# Patient Record
Sex: MALE | Race: WHITE | NOT HISPANIC OR LATINO | Employment: OTHER | URBAN - METROPOLITAN AREA
[De-identification: names, ages, dates, MRNs, and addresses within clinical notes are randomized per-mention and may not be internally consistent; named-entity substitution may affect disease eponyms.]

---

## 2017-10-05 ENCOUNTER — OFFICE VISIT - RIVER FALLS (OUTPATIENT)
Dept: FAMILY MEDICINE | Facility: CLINIC | Age: 57
End: 2017-10-05

## 2017-10-05 ASSESSMENT — MIFFLIN-ST. JEOR: SCORE: 1664.94

## 2018-01-04 ENCOUNTER — OFFICE VISIT - RIVER FALLS (OUTPATIENT)
Dept: FAMILY MEDICINE | Facility: CLINIC | Age: 58
End: 2018-01-04

## 2018-01-04 ASSESSMENT — MIFFLIN-ST. JEOR: SCORE: 1696.7

## 2018-01-05 LAB
CHOLEST SERPL-MCNC: 143 MG/DL
CHOLEST/HDLC SERPL: 5.7 {RATIO}
CREAT SERPL-MCNC: 1.21 MG/DL (ref 0.7–1.33)
GLUCOSE BLD-MCNC: 99 MG/DL (ref 65–99)
HDLC SERPL-MCNC: 25 MG/DL
LDLC SERPL CALC-MCNC: 85 MG/DL
NONHDLC SERPL-MCNC: 118 MG/DL
PSA SERPL-MCNC: 0.6 NG/ML
TRIGL SERPL-MCNC: 251 MG/DL

## 2018-01-15 ENCOUNTER — AMBULATORY - RIVER FALLS (OUTPATIENT)
Dept: FAMILY MEDICINE | Facility: CLINIC | Age: 58
End: 2018-01-15

## 2018-01-16 ENCOUNTER — AMBULATORY - RIVER FALLS (OUTPATIENT)
Dept: FAMILY MEDICINE | Facility: CLINIC | Age: 58
End: 2018-01-16

## 2018-02-05 ENCOUNTER — OFFICE VISIT - RIVER FALLS (OUTPATIENT)
Dept: FAMILY MEDICINE | Facility: CLINIC | Age: 58
End: 2018-02-05

## 2018-02-16 ENCOUNTER — OFFICE VISIT - RIVER FALLS (OUTPATIENT)
Dept: FAMILY MEDICINE | Facility: CLINIC | Age: 58
End: 2018-02-16

## 2018-02-16 ASSESSMENT — MIFFLIN-ST. JEOR: SCORE: 1696.7

## 2018-08-28 ENCOUNTER — OFFICE VISIT - RIVER FALLS (OUTPATIENT)
Dept: FAMILY MEDICINE | Facility: CLINIC | Age: 58
End: 2018-08-28

## 2018-11-01 ENCOUNTER — OFFICE VISIT - RIVER FALLS (OUTPATIENT)
Dept: FAMILY MEDICINE | Facility: CLINIC | Age: 58
End: 2018-11-01

## 2018-11-01 ASSESSMENT — MIFFLIN-ST. JEOR: SCORE: 1710.3

## 2018-11-29 ENCOUNTER — AMBULATORY - RIVER FALLS (OUTPATIENT)
Dept: FAMILY MEDICINE | Facility: CLINIC | Age: 58
End: 2018-11-29

## 2018-11-30 LAB
CHOLEST SERPL-MCNC: 221 MG/DL
CHOLEST/HDLC SERPL: 8.5 {RATIO}
CREAT SERPL-MCNC: 1.21 MG/DL (ref 0.7–1.33)
GLUCOSE BLD-MCNC: 100 MG/DL (ref 65–99)
HDLC SERPL-MCNC: 26 MG/DL
LDLC SERPL CALC-MCNC: 139 MG/DL
NONHDLC SERPL-MCNC: 195 MG/DL
PSA SERPL-MCNC: 0.8 NG/ML
TRIGL SERPL-MCNC: 366 MG/DL

## 2019-10-29 ENCOUNTER — OFFICE VISIT - RIVER FALLS (OUTPATIENT)
Dept: FAMILY MEDICINE | Facility: CLINIC | Age: 59
End: 2019-10-29

## 2019-10-29 ASSESSMENT — MIFFLIN-ST. JEOR: SCORE: 1710.67

## 2019-10-30 LAB
ALT SERPL W P-5'-P-CCNC: 19 UNIT/L
CREAT SERPL-MCNC: 1.14 MG/DL
HCT VFR BLD AUTO: 50.5 %
HGB BLD-MCNC: 16.5 G/DL
MCV RBC AUTO: 89.9 FL
NEUTROPHILS # BLD AUTO: 2.4 %
PLATELET # BLD AUTO: 211 X10

## 2019-10-31 ENCOUNTER — AMBULATORY - RIVER FALLS (OUTPATIENT)
Dept: FAMILY MEDICINE | Facility: CLINIC | Age: 59
End: 2019-10-31

## 2019-11-01 ENCOUNTER — COMMUNICATION - RIVER FALLS (OUTPATIENT)
Dept: FAMILY MEDICINE | Facility: CLINIC | Age: 59
End: 2019-11-01

## 2019-11-01 LAB
ALT SERPL W P-5'-P-CCNC: 21 UNIT/L (ref 9–46)
CHOLEST SERPL-MCNC: 160 MG/DL
CHOLEST/HDLC SERPL: 4.8 {RATIO}
GLUCOSE BLD-MCNC: 100 MG/DL (ref 65–99)
HDLC SERPL-MCNC: 33 MG/DL
LDLC SERPL CALC-MCNC: 96 MG/DL
NONHDLC SERPL-MCNC: 127 MG/DL
PSA SERPL-MCNC: 1.3 NG/ML
TRIGL SERPL-MCNC: 215 MG/DL

## 2020-08-25 ENCOUNTER — OFFICE VISIT - RIVER FALLS (OUTPATIENT)
Dept: FAMILY MEDICINE | Facility: CLINIC | Age: 60
End: 2020-08-25

## 2020-08-25 LAB
ALBUMIN UR-MCNC: NEGATIVE G/DL
BILIRUB UR QL STRIP: NEGATIVE
GLUCOSE UR STRIP-MCNC: NEGATIVE MG/DL
HGB UR QL STRIP: NEGATIVE
KETONES UR STRIP-MCNC: NEGATIVE MG/DL
LEUKOCYTE ESTERASE UR QL STRIP: NEGATIVE
NITRATE UR QL: NEGATIVE
PH UR STRIP: 5.5 [PH] (ref 5–8)
SP GR UR STRIP: 1.01 (ref 1–1.03)

## 2020-10-06 ENCOUNTER — COMMUNICATION - RIVER FALLS (OUTPATIENT)
Dept: FAMILY MEDICINE | Facility: CLINIC | Age: 60
End: 2020-10-06

## 2020-10-21 ENCOUNTER — COMMUNICATION - RIVER FALLS (OUTPATIENT)
Dept: FAMILY MEDICINE | Facility: CLINIC | Age: 60
End: 2020-10-21

## 2020-12-14 ENCOUNTER — OFFICE VISIT - RIVER FALLS (OUTPATIENT)
Dept: FAMILY MEDICINE | Facility: CLINIC | Age: 60
End: 2020-12-14

## 2020-12-14 ASSESSMENT — MIFFLIN-ST. JEOR: SCORE: 1660.41

## 2020-12-18 ENCOUNTER — AMBULATORY - RIVER FALLS (OUTPATIENT)
Dept: FAMILY MEDICINE | Facility: CLINIC | Age: 60
End: 2020-12-18

## 2020-12-19 LAB
A/G RATIO - HISTORICAL: 2 (ref 1–2.5)
ALBUMIN SERPL-MCNC: 4.6 GM/DL (ref 3.6–5.1)
ALP SERPL-CCNC: 67 UNIT/L (ref 35–144)
ALT SERPL W P-5'-P-CCNC: 13 UNIT/L (ref 9–46)
AST SERPL W P-5'-P-CCNC: 25 UNIT/L (ref 10–35)
BILIRUB SERPL-MCNC: 0.6 MG/DL (ref 0.2–1.2)
BUN SERPL-MCNC: 23 MG/DL (ref 7–25)
BUN/CREAT RATIO - HISTORICAL: NORMAL (ref 6–22)
CALCIUM SERPL-MCNC: 9.6 MG/DL (ref 8.6–10.3)
CHLORIDE BLD-SCNC: 104 MMOL/L (ref 98–110)
CHOLEST SERPL-MCNC: 147 MG/DL
CHOLEST/HDLC SERPL: 4.9 {RATIO}
CO2 SERPL-SCNC: 29 MMOL/L (ref 20–32)
CREAT SERPL-MCNC: 1.23 MG/DL (ref 0.7–1.33)
EGFRCR SERPLBLD CKD-EPI 2021: 64 ML/MIN/1.73M2
ERYTHROCYTE [DISTWIDTH] IN BLOOD BY AUTOMATED COUNT: 13.3 % (ref 11–15)
GLOBULIN: 2.3 (ref 1.9–3.7)
GLUCOSE BLD-MCNC: 99 MG/DL (ref 65–99)
HCT VFR BLD AUTO: 49.1 % (ref 38.5–50)
HDLC SERPL-MCNC: 30 MG/DL
HGB BLD-MCNC: 16.9 GM/DL (ref 13.2–17.1)
LDLC SERPL CALC-MCNC: 83 MG/DL
MCH RBC QN AUTO: 30.3 PG (ref 27–33)
MCHC RBC AUTO-ENTMCNC: 34.4 GM/DL (ref 32–36)
MCV RBC AUTO: 88 FL (ref 80–100)
NONHDLC SERPL-MCNC: 117 MG/DL
PLATELET # BLD AUTO: 239 10*3/UL (ref 140–400)
PMV BLD: 10 FL (ref 7.5–12.5)
POTASSIUM BLD-SCNC: 4.3 MMOL/L (ref 3.5–5.3)
PROT SERPL-MCNC: 6.9 GM/DL (ref 6.1–8.1)
PSA SERPL-MCNC: 0.7 NG/ML
RBC # BLD AUTO: 5.58 10*6/UL (ref 4.2–5.8)
SODIUM SERPL-SCNC: 142 MMOL/L (ref 135–146)
TESTOSTERONE TOTAL: 597 NG/DL (ref 250–827)
TRIGL SERPL-MCNC: 242 MG/DL
TSH SERPL DL<=0.005 MIU/L-ACNC: 2.4 MIU/L (ref 0.4–4.5)
WBC # BLD AUTO: 6.7 10*3/UL (ref 3.8–10.8)

## 2020-12-21 ENCOUNTER — COMMUNICATION - RIVER FALLS (OUTPATIENT)
Dept: FAMILY MEDICINE | Facility: CLINIC | Age: 60
End: 2020-12-21

## 2021-06-24 ENCOUNTER — OFFICE VISIT - RIVER FALLS (OUTPATIENT)
Dept: FAMILY MEDICINE | Facility: CLINIC | Age: 61
End: 2021-06-24

## 2021-06-26 LAB — LYME AB SCREEN - QUEST: >10

## 2021-06-28 ENCOUNTER — COMMUNICATION - RIVER FALLS (OUTPATIENT)
Dept: FAMILY MEDICINE | Facility: CLINIC | Age: 61
End: 2021-06-28

## 2021-06-30 ENCOUNTER — COMMUNICATION - RIVER FALLS (OUTPATIENT)
Dept: FAMILY MEDICINE | Facility: CLINIC | Age: 61
End: 2021-06-30

## 2021-07-06 LAB
A/G RATIO - HISTORICAL: 1.3 MG/DL
ALBUMIN SERPL-MCNC: 4.5 G/DL
ALP SERPL-CCNC: 174 UNIT/L
ALT SERPL W P-5'-P-CCNC: 71 UNIT/L
ANION GAP SERPL CALCULATED.3IONS-SCNC: 9 MEQ/L
BASOPHILS # BLD MANUAL: 0 CELLS/UL
BILIRUB SERPL-MCNC: 0.4 MG/DL
BUN SERPL-MCNC: 20 MG/DL
BUN/CREAT RATIO - HISTORICAL: 17
CALCIUM SERPL-MCNC: 9.4 MEQ/DL
CHLORIDE BLD-SCNC: 105 MEQ/L
CO2 SERPL-SCNC: 24 MEQ/L
CREAT SERPL-MCNC: 1.15 MG/DL
EOSINOPHIL NFR BLD AUTO: 0.5 %
EOSINOPHIL NFR BLD AUTO: 6 %
ERYTHROCYTE [DISTWIDTH] IN BLOOD BY AUTOMATED COUNT: 14.2 %
GLOBULIN: 3.4 G/DL
GLUCOSE BLD-MCNC: 115 MG/DL
HCT VFR BLD AUTO: 44.2 %
HGB BLD-MCNC: 15.1 G/DL
LYMPHOCYTES NFR BLD AUTO: 1.5 %
LYMPHOCYTES NFR BLD AUTO: 17 %
MCH RBC QN AUTO: 29.5 PG
MCHC RBC AUTO-ENTMCNC: 34.2 GM/DL
MCV RBC AUTO: 86 FL
MONOCYTES NFR BLD AUTO: 0.3 %
MONOCYTES NFR BLD AUTO: 4 %
NEUTROPHILS # BLD AUTO: 6.3 %
NEUTROPHILS NFR BLD AUTO: 72 %
PLATELET # BLD AUTO: 302 X10
PMV BLD: 9.3 FL
POTASSIUM BLD-SCNC: 4 MEQ/L
PROT SERPL-MCNC: 7.9 GM/DL
RBC # BLD AUTO: 5.12 X10
SODIUM SERPL-SCNC: 138 MEQ/L
WBC # BLD AUTO: 8.7 X10

## 2021-08-26 ENCOUNTER — OFFICE VISIT - RIVER FALLS (OUTPATIENT)
Dept: FAMILY MEDICINE | Facility: CLINIC | Age: 61
End: 2021-08-26

## 2021-08-26 ASSESSMENT — MIFFLIN-ST. JEOR: SCORE: 1675.83

## 2021-08-27 LAB
ALBUMIN UR-MCNC: NEGATIVE G/DL
APPEARANCE UR: CLEAR
BILIRUB UR QL STRIP: NEGATIVE
COLOR UR AUTO: YELLOW
GLUCOSE UR STRIP-MCNC: NEGATIVE MG/DL
HGB UR QL STRIP: NEGATIVE
KETONES UR STRIP-MCNC: NEGATIVE MG/DL
LEUKOCYTE ESTERASE UR QL STRIP: NEGATIVE
NITRATE UR QL: NEGATIVE
PH UR STRIP: 5.5 [PH]
SP GR UR STRIP: 1.02
UROBILINOGEN UR STRIP-MCNC: NORMAL MG/DL

## 2021-08-28 LAB — BACTERIA SPEC CULT: NORMAL

## 2021-09-09 ENCOUNTER — COMMUNICATION - RIVER FALLS (OUTPATIENT)
Dept: FAMILY MEDICINE | Facility: CLINIC | Age: 61
End: 2021-09-09

## 2021-09-29 DIAGNOSIS — Z11.59 ENCOUNTER FOR SCREENING FOR OTHER VIRAL DISEASES: ICD-10-CM

## 2021-10-07 ENCOUNTER — OFFICE VISIT - RIVER FALLS (OUTPATIENT)
Dept: FAMILY MEDICINE | Facility: CLINIC | Age: 61
End: 2021-10-07

## 2021-10-07 ASSESSMENT — MIFFLIN-ST. JEOR: SCORE: 1665.4

## 2021-12-10 ENCOUNTER — COMMUNICATION - RIVER FALLS (OUTPATIENT)
Dept: FAMILY MEDICINE | Facility: CLINIC | Age: 61
End: 2021-12-10

## 2022-01-27 ENCOUNTER — COMMUNICATION - RIVER FALLS (OUTPATIENT)
Dept: FAMILY MEDICINE | Facility: CLINIC | Age: 62
End: 2022-01-27

## 2022-02-12 VITALS
WEIGHT: 200.1 LBS | HEART RATE: 72 BPM | WEIGHT: 202.4 LBS | SYSTOLIC BLOOD PRESSURE: 138 MMHG | SYSTOLIC BLOOD PRESSURE: 134 MMHG | BODY MASS INDEX: 32.16 KG/M2 | TEMPERATURE: 98.4 F | DIASTOLIC BLOOD PRESSURE: 76 MMHG | DIASTOLIC BLOOD PRESSURE: 82 MMHG | HEART RATE: 76 BPM | HEIGHT: 66 IN | OXYGEN SATURATION: 97 % | TEMPERATURE: 97.4 F | BODY MASS INDEX: 32.53 KG/M2 | HEIGHT: 66 IN

## 2022-02-12 VITALS
SYSTOLIC BLOOD PRESSURE: 122 MMHG | DIASTOLIC BLOOD PRESSURE: 68 MMHG | HEART RATE: 68 BPM | TEMPERATURE: 97.9 F | WEIGHT: 204 LBS

## 2022-02-12 VITALS
HEART RATE: 74 BPM | TEMPERATURE: 98.6 F | BODY MASS INDEX: 33.76 KG/M2 | WEIGHT: 210.08 LBS | DIASTOLIC BLOOD PRESSURE: 80 MMHG | HEIGHT: 66 IN | SYSTOLIC BLOOD PRESSURE: 146 MMHG

## 2022-02-12 VITALS
HEIGHT: 66 IN | HEIGHT: 66 IN | HEART RATE: 63 BPM | DIASTOLIC BLOOD PRESSURE: 74 MMHG | SYSTOLIC BLOOD PRESSURE: 166 MMHG | HEART RATE: 76 BPM | WEIGHT: 207 LBS | SYSTOLIC BLOOD PRESSURE: 142 MMHG | BODY MASS INDEX: 33.27 KG/M2 | WEIGHT: 207 LBS | BODY MASS INDEX: 33.27 KG/M2 | DIASTOLIC BLOOD PRESSURE: 77 MMHG

## 2022-02-12 VITALS
SYSTOLIC BLOOD PRESSURE: 135 MMHG | HEIGHT: 66 IN | DIASTOLIC BLOOD PRESSURE: 80 MMHG | BODY MASS INDEX: 31.98 KG/M2 | DIASTOLIC BLOOD PRESSURE: 78 MMHG | HEIGHT: 66 IN | TEMPERATURE: 97.5 F | WEIGHT: 199 LBS | HEART RATE: 66 BPM | SYSTOLIC BLOOD PRESSURE: 152 MMHG | BODY MASS INDEX: 32.12 KG/M2

## 2022-02-12 VITALS
TEMPERATURE: 97.3 F | WEIGHT: 204.8 LBS | SYSTOLIC BLOOD PRESSURE: 150 MMHG | HEART RATE: 80 BPM | DIASTOLIC BLOOD PRESSURE: 90 MMHG | BODY MASS INDEX: 33.06 KG/M2

## 2022-02-12 VITALS
DIASTOLIC BLOOD PRESSURE: 70 MMHG | SYSTOLIC BLOOD PRESSURE: 144 MMHG | WEIGHT: 210 LBS | HEIGHT: 66 IN | BODY MASS INDEX: 33.75 KG/M2 | HEART RATE: 70 BPM

## 2022-02-12 VITALS
DIASTOLIC BLOOD PRESSURE: 74 MMHG | WEIGHT: 200 LBS | SYSTOLIC BLOOD PRESSURE: 148 MMHG | HEART RATE: 70 BPM | BODY MASS INDEX: 32.14 KG/M2 | HEIGHT: 66 IN

## 2022-02-12 VITALS
DIASTOLIC BLOOD PRESSURE: 79 MMHG | TEMPERATURE: 98.1 F | SYSTOLIC BLOOD PRESSURE: 151 MMHG | HEART RATE: 67 BPM | WEIGHT: 196.4 LBS

## 2022-02-15 NOTE — NURSING NOTE
Quick Intake Entered On:  8/25/2020 11:22 AM CDT    Performed On:  8/25/2020 11:20 AM CDT by Opal Bangura CMA               Summary   Systolic Blood Pressure :   150 mmHg (HI)    Diastolic Blood Pressure :   90 mmHg (HI)    Mean Arterial Pressure :   110 mmHg   BP Site :   Right arm   BP Method :   Manual   Opal Bangura CMA - 8/25/2020 11:20 AM CDT

## 2022-02-15 NOTE — TELEPHONE ENCOUNTER
---------------------  From: Rylan/Teresa CRABTREE (Phone Messages Pool ([id number]_[facility]))   To: Advanced Practice Provider Pool ([id number]_[facility]);     Sent: 9/9/2021 8:45:03 AM CDT  Subject: FW: Prescription     Please advise. Flonase was filled 12/26/16.         ---------------------  From: GENNA MONCADA  To: [facility]  Sent: 09/09/2021 08:42 a.m. CDT  Subject: Prescription  Hello,    I would like to get a prescription for a nasal spray for seasonal allergies. I have used Nasonex successfully in the past and would prefer it, but am open to others. Due to severe BPH issues an oral decongestant or antihistamine is not an option right now.    Thank you!  Anand Moncada---------------------  From: Rea Pierre (Advanced Practice Provider Pool ([id number]_[facility]))   To: Phone ProspX Pool ([id number]_[place] [facility] [place]);     Sent: 9/10/2021 2:07:33 PM CDT  Subject: RE: Prescription     please send rx for Nasonex 1 inhalation each nostril daily #1 with 9 refillsRx sent. Will send new message letting pt know

## 2022-02-15 NOTE — NURSING NOTE
Comprehensive Intake Entered On:  8/26/2021 5:24 PM CDT    Performed On:  8/26/2021 5:17 PM CDT by Eve Baig               Summary   Chief Complaint :   Difficulty emptying bladder x1-2 weeks, states he hasn't had a problem since a year ago, on flomax.   Weight Measured :   202.4 lb(Converted to: 202 lb 6 oz, 91.807 kg)    Height Measured :   66 in(Converted to: 5 ft 6 in, 167.64 cm)    Body Mass Index :   32.66 kg/m2 (HI)    Body Surface Area :   2.07 m2   Systolic Blood Pressure :   138 mmHg (HI)    Diastolic Blood Pressure :   82 mmHg (HI)    Mean Arterial Pressure :   101 mmHg   Peripheral Pulse Rate :   76 bpm   BP Site :   Right arm   Pulse Site :   Radial artery   BP Method :   Manual   HR Method :   Manual   Temperature Tympanic :   98.4 DegF(Converted to: 36.9 DegC)    Eve Baig - 8/26/2021 5:17 PM CDT   Health Status   Allergies Verified? :   Yes   Medication History Verified? :   Yes   Medical History Verified? :   Yes   Pre-Visit Planning Status :   Completed   Tobacco Use? :   Never smoker   Eve Baig - 8/26/2021 5:17 PM CDT   Consents   Consent for Immunization Exchange :   Consent Granted   Consent for Immunizations to Providers :   Consent Granted   Eve Baig - 8/26/2021 5:17 PM CDT   Meds / Allergies   (As Of: 8/26/2021 5:24:20 PM CDT)   Allergies (Active)   Dust  Estimated Onset Date:   Unspecified ; Reactions:   Sneezing.. ; Created By:   Jeanette Manzo; Reaction Status:   Active ; Category:   Environment ; Substance:   Dust ; Type:   Allergy ; Updated By:   Jeanette Manzo; Source:   Other ; Reviewed Date:   8/26/2021 5:23 PM CDT      Mold  Estimated Onset Date:   Unspecified ; Reactions:   Sneezing.. ; Created By:   Jeanette Manzo; Reaction Status:   Active ; Category:   Environment ; Substance:   Mold ; Type:   Allergy ; Updated By:   Jeanette Manzo; Source:   Other ; Reviewed Date:   8/26/2021 5:23 PM CDT      No Known Medication Allergies  Estimated Onset Date:    Unspecified ; Created By:   Felicity Perez MA; Reaction Status:   Active ; Category:   Drug ; Substance:   No Known Medication Allergies ; Type:   Allergy ; Updated By:   Felicity Perez MA; Reviewed Date:   8/26/2021 5:23 PM CDT      Pollen  Estimated Onset Date:   Unspecified ; Reactions:   Sneezing.. ; Created By:   Jeanette Manzo; Reaction Status:   Active ; Category:   Environment ; Substance:   Pollen ; Type:   Allergy ; Updated By:   Jeanette Manzo; Source:   Other ; Reviewed Date:   8/26/2021 5:23 PM CDT        Medication List   (As Of: 8/26/2021 5:24:20 PM CDT)   Prescription/Discharge Order    sildenafil  :   sildenafil ; Status:   Prescribed ; Ordered As Mnemonic:   Viagra 50 mg oral tablet ; Simple Display Line:   50 mg, 1 tab(s), Oral, daily, 1 hour before sexual activity, 60 tab(s), 5 Refill(s) ; Ordering Provider:   Charles Churchill MD; Catalog Code:   sildenafil ; Order Dt/Tm:   6/24/2021 8:53:55 AM CDT          simvastatin  :   simvastatin ; Status:   Prescribed ; Ordered As Mnemonic:   simvastatin 40 mg oral tablet ; Simple Display Line:   40 mg, 1 tab(s), PO, Once a day (at bedtime), 90 tab(s), 3 Refill(s) ; Ordering Provider:   Charles Churchill MD; Catalog Code:   simvastatin ; Order Dt/Tm:   12/14/2020 11:19:20 AM CST          tamsulosin  :   tamsulosin ; Status:   Prescribed ; Ordered As Mnemonic:   tamsulosin 0.4 mg oral capsule ; Simple Display Line:   0.4 mg, 1 cap(s), Oral, daily, for 90 day(s), 90 cap(s), 3 Refill(s) ; Ordering Provider:   Charles Churchill MD; Catalog Code:   tamsulosin ; Order Dt/Tm:   10/8/2020 9:08:40 AM CDT          fluticasone nasal  :   fluticasone nasal ; Status:   Prescribed ; Ordered As Mnemonic:   Flonase 50 mcg/inh nasal spray ; Simple Display Line:   2 spray(s), nasal, daily, 1 EA, 11 Refill(s) ; Ordering Provider:   Charles Churchill MD; Catalog Code:   fluticasone nasal ; Order Dt/Tm:   12/26/2016 8:54:51 AM CST            Home Meds    carbidopa-levodopa  :    carbidopa-levodopa ; Status:   Documented ; Ordered As Mnemonic:   carbidopa-levodopa 50 mg-200 mg oral tablet, extended release ; Simple Display Line:   1 tab(s), po, tid, 0 Refill(s) ; Catalog Code:   carbidopa-levodopa ; Order Dt/Tm:   6/27/2016 2:45:43 PM CDT          fexofenadine  :   fexofenadine ; Status:   Documented ; Ordered As Mnemonic:   Allegra ; Simple Display Line:   po ; Catalog Code:   fexofenadine ; Order Dt/Tm:   2/24/2015 1:23:44 PM CST            Social History   Social History   (As Of: 8/26/2021 5:24:20 PM CDT)   Alcohol:        Current, 1-2 times per month, 3 drinks/episode average.   (Last Updated: 10/30/2019 12:01:12 PM CDT by Carla Pandey CMA)          Tobacco:  Denies Tobacco Use      Never   (Last Updated: 11/29/2010 2:18:41 PM CST by Charles Churchill MD)   Never (less than 100 in lifetime)   (Last Updated: 12/14/2020 10:55:57 AM CST by Rocio Zhang CMA)          Electronic Cigarette/Vaping:        Electronic Cigarette Use: Never.   (Last Updated: 12/14/2020 10:56:04 AM CST by Rocio Zhang CMA)          Substance Abuse:        Never   (Last Updated: 12/27/2012 11:32:55 AM CST by Nay Chaney)          Employment/School:        Employed, Work/School description:  - 3M.   (Last Updated: 12/27/2012 12:08:52 PM CST by Nay Chaney)          Home/Environment:        Marital status: .  Spouse/Partner name: Sami  Lives with Spouse.   (Last Updated: 12/27/2012 11:44:08 AM CST by Nay Chaney)          Nutrition/Health:        Type of diet: Regular.  Caffeine intake amount: Rare.   (Last Updated: 12/27/2012 12:06:14 PM CST by Nay Chaney)          Exercise:        Exercise frequency: 3-4 times/week.   (Last Updated: 12/27/2012 11:33:11 AM CST by Nay Chaney)          Sexual:        Sexually active: Yes.  Sexual orientation: Heterosexual.   (Last Updated: 12/27/2012 11:33:31 AM CST by Nay Chaney)          Other:        Wears glasses.   (Last Updated:  12/27/2012 12:06:31 PM CST by Nay Chaney)

## 2022-02-15 NOTE — TELEPHONE ENCOUNTER
---------------------  From: Reji Neal   To: Charles Churchill MD;     Sent: 12/31/2019 10:20:19 AM CST  Subject: General Message     Per [facility], patient declined scheduling stress test you ordered in October.---------------------  From: Charles Churchill MD   To: Reji Neal;     Sent: 12/31/2019 11:55:08 AM CST  Subject: RE: General Message     Needs to be seen.---------------------  From: Reji Neal   To: Charles Churchill MD;     Sent: 1/2/2020 2:02:09 PM CST  Subject: RE: General Message     Left message for patient to call.---------------------  From: Reji Neal   To: Charles Churchill MD;     Sent: 1/3/2020 9:12:36 AM CST  Subject: RE: General Message     Patient returned my call today. Patient did not have stress test as he had concerns with having the test due to 'battery pack' in his chest.  He said he is now in [place] for the winter and is feeling fine. He can be reached on his cell phone in [place] if you would like to speak with him [phone].

## 2022-02-15 NOTE — LETTER
(Inserted Image. Unable to display)     October 30, 2020      GENNA BAILEY  [id number] [place], [place] [id number]          Dear GENNA,      Thank you for selecting [facility] (previously [facility]) for your healthcare needs.    Our records indicate you are due for the following services:    Annual Physical.    Fasting Lab Tests ~ Please do not eat or drink anything 10 hours prior to your scheduled appointment time.  (Water and any medications that you may need are allowed unless directed otherwise.)    If you had your labs done at another facility or with [facility] at [facility], please bring in a copy of the results to your next visit, mail a copy, or drop off a copy of your results to your Healthcare Provider.    (FYI   Regarding office visits: In some instances, a video visit or telephone visit may be offered as an option.)    You are due for lab work and an office visit; please schedule the lab appointment 1 week before the office visit.  This will assure all results are available to discuss with your Healthcare Provider during your visit.    **It is very helpful if you bring your medication bottles to your appointment.  This assures we have all of your current medications, including strength and dosing information, documented accurately in your medical record.    To schedule an appointment or if you have further questions, please contact your clinic at [phone].      Powered by [employer]    Sincerely,    Charles Churchill MD, FACP

## 2022-02-15 NOTE — PROGRESS NOTES
Chief Complaint    follow up - needs labs due to antibiotics  History of Present Illness      Patient has Parkinson's disease and this spring he had a deep brain stimulator placed which has been helpful.  Several months later he developed an infection at the chest site of the device which was treated with inpatient antibiotics and chronic suppressive therapy.  He tells me it was methicillin sensitive staph aureus.  Pressure readings remain good at home.  Needs a refill simvastatin Cialis.  Has had intermittent heartburn for for the past couple of months relieved by over-the-counter medication.  No dysphagia, odynophagia, weight loss, nausea or vomiting.  Needs to have lab work done to monitor his infection and antibiotics get that information to us.  Review of Systems      No fevers, chills, falls chest pain, dyspnea, edema.  Physical Exam   Vitals & Measurements    HR: 70(Peripheral)  BP: 144/70     HT: 66 in  WT: 210 lb  BMI: 33.89       Patient appears comfortable and in no distress.  Alert and oriented.  Concerning tremor right hand.  Chest clear.  Device left upper chest with no tenderness warmth erythema.  Heart exam regular no murmur.  Abdomen without hepatosplenomegaly.  Extremities have no edema.  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  We will get the order for lab work from [facility].         Orders:          54918 office outpatient visit 25 minutes (Charge), Quantity: 1, CNS device complication  Fatty Liver  Parkinsonian syndrome  White coat hypertension  Metabolic Syndrome  S/P deep brain stimulator placement                ED (erectile dysfunction) (N52.9)         Stable.                Fatty Liver (K76.0)        Stable          Orders:          92018 office outpatient visit 25 minutes (Charge), Quantity: 1, CNS device complication  Fatty Liver  Parkinsonian syndrome  White coat hypertension  Metabolic  Syndrome  S/P deep brain stimulator placement                GERD (gastroesophageal reflux disease) (K21.9)         EGD if not better.  Recommended a trial of stopping Cialis and simvastatin.                Metabolic Syndrome (E88.81)         On simvastatin.         Orders:          79002 office outpatient visit 25 minutes (Charge), Quantity: 1, CNS device complication  Fatty Liver  Parkinsonian syndrome  White coat hypertension  Metabolic Syndrome  S/P deep brain stimulator placement                Parkinsonian syndrome (G20)         Improved with deep brain stimulation.         Orders:          83343 office outpatient visit 25 minutes (Charge), Quantity: 1, CNS device complication  Fatty Liver  Parkinsonian syndrome  White coat hypertension  Metabolic Syndrome  S/P deep brain stimulator placement                S/P deep brain stimulator placement (Z96.89)         Orders:          57354 office outpatient visit 25 minutes (Charge), Quantity: 1, CNS device complication  Fatty Liver  Parkinsonian syndrome  White coat hypertension  Metabolic Syndrome  S/P deep brain stimulator placement                White coat hypertension (R03.0)         Unchanged         Orders:          18534 office outpatient visit 25 minutes (Charge), Quantity: 1, CNS device complication  Fatty Liver  Parkinsonian syndrome  White coat hypertension  Metabolic Syndrome  S/P deep brain stimulator placement                Orders:         simvastatin, = 1 tab(s) ( 40 mg ), PO, Once a day (at bedtime), # 90 tab(s), 3 Refill(s), Type: Maintenance, Pharmacy: [id number], 1 tab(s) Oral hs, (Ordered)         simvastatin, = 1 tab(s) ( 40 mg ), PO, Once a day (at bedtime), # 90 tab(s), 3 Refill(s), Type: Hard Stop, Pharmacy: [id number], (Completed)         tadalafil, 1 tab(s) ( 10 mg ), po, daily, # 5 tab(s), 11 Refill(s), Type: Maintenance, (Completed)         tadalafil, = 1 tab(s) ( 20 mg ), po, daily, # 88  tab(s), 0 Refill(s), Type: Hard Stop, (Completed)         tadalafil, = 1 tab(s) ( 20 mg ), po, daily, # 88 tab(s), 3 Refill(s), Type: Maintenance, (Ordered)         Return to Clinic (Request), RFV: Annual cholesterol, PSA, glucose, alt  Patient Information     Name:GENNA BAILEY      Address:      [address] [address]     Sex:Male     YOB: 1960     [phone]     Emergency Contact:DAMARIS BAILEY     MRN:[id number]     FIN:[id number]     Location:[facility]     Date of Service:11/01/2018      Primary Care Physician:       Charles Churchill MD, [phone]      Attending Physician:       Charles Churchill MD, [phone]  Problem List/Past Medical History    Ongoing     Allergic Rhinitis     Anxiety     Benign prostatic hyperplasia (BPH) with urinary urgency     CNS device complication     Depression, Recurrent     Deviated Septum     Dyslipidemia       Comments: 10 year estimated CV risk 7.6%. Antihpertnsive med not indicated for 24 hour average /73     ED (erectile dysfunction)     Fatty Liver     Metabolic Syndrome     Obesity     Parkinsonian syndrome     S/P deep brain stimulator placement     White coat hypertension    Historical     No qualifying data  Procedure/Surgical History     Colonoscopy (03/06/2013)      Comments:      Right sided diverticulosis otherwise normal. Repeat 10 years.     Rotator Cuff Repair - Right (12/03/2012)     Complete repair of rotator cuff - Left (11/2009)     Vasectomy (12/06/1995)     Skin graft (1987)     elbow surgery (1982)  Medications        Allegra: po.        carbidopa-levodopa 50 mg-200 mg oral tablet, extended release: 1 tab(s), po, qid, 0 Refill(s).        Flonase 50 mcg/inh nasal spray: 2 spray(s), nasal, daily, 1 EA, 11 Refill(s).        cefadroxil 1000 mg oral tablet: 1,000 mg, 1 tab(s), Oral, q12 hrs, 0 Refill(s).        simvastatin 40 mg oral tablet: 40 mg, 1 tab(s), PO, Once a day (at  bedtime), 90 tab(s), 3 Refill(s).        Cialis 20 mg oral tablet: 20 mg, 1 tab(s), po, daily, 88 tab(s), 3 Refill(s).         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 Spouse/Partner name: Yoana. Lives with Spouse., 12/27/2012     Nutrition and Health      Type of diet: Regular. Caffeine intake amount: Rare., 12/27/2012     Other      Wears glasses., 12/27/2012     Sexual      Sexually active: Yes. Sexual orientation: Heterosexual., 12/27/2012     Substance Abuse      Never, 12/27/2012     Tobacco      Never, 11/29/2010  Family History    Patient was adopted  Immunizations      Vaccine Date Status      tetanus/diphth/pertuss (Tdap) adult/adol 12/22/2015 Given      Td 10/18/2004 Recorded  Lab Results          Lab Results (Last 4 results within 90 days)           C-Reactive Protein (CRP): 1.7 mg/L [4.5  - 11] (08/28/18 20:29:00)          WBC: 11.5 High [4.5  - 11] (08/28/18 20:28:00)          RBC: 5.51 [4.3  - 5.9] (08/28/18 20:28:00)          Hgb: 16.4 [13.5 g/dL - 17.5 g/dL] (08/28/18 20:28:00)          Hct: 46.4 [37 % - 53 %] (08/28/18 20:28:00)          MCV: 84 [80 fL - 100 fL] (08/28/18 20:28:00)          MCH: 29.8 [26 pg - 34 pg] (08/28/18 20:28:00)          MCHC: 35.3 [32 g/dL - 36 g/dL] (08/28/18 20:28:00)          RDW: 13.6 [11.5 % - 15.5 %] (08/28/18 20:28:00)          Platelet: 217 [140  - 440] (08/28/18 20:28:00)          MPV: 9.8 [6.5 fL - 11 fL] (08/28/18 20:28:00)          Lymphocytes: 23.3 [4.5  - 11] (08/28/18 20:28:00)          Abs Lymphocytes: 2.7 [0.9  - 2.9] (08/28/18 20:28:00)           Neutrophils: 66.6 [0.9  - 2.9] (08/28/18 20:28:00)          Abs Neutrophils: 7.7 High [1.7  - 7] (08/28/18 20:28:00)          Monocytes: 8.2 [4.5  - 11] (08/28/18 20:28:00)          Abs Monocytes: 1.0 High [0.9  - 2.9] (08/28/18 20:28:00)          Eosinophils: 1.7 [4.5  - 11] (08/28/18 20:28:00)          Abs Eosinophils: 0.2 [0.9  - 2.9] (08/28/18 20:28:00)          Basophils: 0.2 [4.5  - 11] (08/28/18 20:28:00)          Abs Basophils: 0.0 [0.9  - 2.9] (08/28/18 20:28:00)          Sed Rate: 4 [11.5 % - 15.5 %] (08/28/18 20:28:00)
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
## 2022-02-15 NOTE — LETTER
(Inserted Image. Unable to display)                                                                                                [address] [facility] [address]                                                August 26, 2020Re: GENNA EASTONSHREYA1960 [facility] [address] [place] [address]Vg:  [facility]rologyThe following patient has been referred to your office/practice:  GENNA LYNN Appointment:  Appointment is PendingPlease refer to the attached  clinical documentation for a summary of GENNA's care.  Please do not hesitate to contact our office if any additional clinical questions arise.  All relevant records and transition of care documents should be mailed or faxed.Your assistance in providing continuity of care is appreciated. Sincerely, [facility] [place] [address](P) [phone](F) [phone]
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
## 2022-02-15 NOTE — PROGRESS NOTES
Chief Complaint    Difficulty emptying bladder x1-2 weeks, states he hasn't had a problem since a year ago, on flomax.  History of Present Illness       Patient is here because trouble urinating.       1 year ago he had complete retention had a Duron placed and then started on Flomax.  Since then he is not had a problem.  The last 2 weeks he has noticed he is not completely emptying and was going to the bathroom more frequently.  He is not having any pain.  He has still taking his Flomax  Review of Systems       No fever, no chills,  Physical Exam   Vitals & Measurements    T: 98.4  F (Tympanic)  HR: 76 (Peripheral)  BP: 138/82     HT: 66 in  WT: 202.4 lb  BMI: 32.66        Alert and oriented       Abdomen is soft       Bladder scan consistent with 250 cc of urine  Assessment/Plan       1. Difficulty urinating (R39.198)          Patient urinated spontaneously little over 100 cc says he felt normal at this time.  We discussed how not completely emptying his bladder and this is leading to more frequent urination.  We could place a catheter and teach him to do and out catheterization but he like to see how the next few days ago.  Told he could increase the Flomax to 0.8 although it does not always give extra help.  Increase dose.  And we can start finasteride 5 mg but that may take a few weeks to have good benefit.         Ordered:          Culture, Urine, Routine* ([facility]), Specimen Type: Urine (Clean Catch), Collection Date: 08/26/21 17:53:00 CDT           Patient Information     Name:GENNA BAILEY      Address:      [address] [id number]     Sex:Male     YOB: 1960     [phone]     Emergency Contact:DAMARIS BAILEY     MRN:[id number]     FIN:[id number]     Location:[facility]     Date of Service:08/26/2021      Primary Care Physician:       Charles Churchill MD, [phone]      Attending Physician:       Saul Roman MD, [phone]  Problem  List/Past Medical History    Ongoing     Allergic Rhinitis     Anxiety     Benign prostatic hyperplasia (BPH) with urinary urgency     CNS device complication     Depression, Recurrent     Deviated Septum     Dyslipidemia       Comments: 10 year estimated CV risk 7.6%. Antihpertnsive med not indicated for 24 hour average /73     ED (erectile dysfunction)     Fatty Liver     Metabolic Syndrome     Obesity     Parkinsonian syndrome     S/P deep brain stimulator placement     White coat syndrome without hypertension    Historical     No qualifying data  Procedure/Surgical History     Polysomnogram (02/05/2018)      Comments: AHI: 3.0.     Colonoscopy (03/06/2013)      Comments: Right sided diverticulosis otherwise normal. Repeat 10 years..     Rotator Cuff Repair - Right (12/03/2012)     Complete repair of rotator cuff - Left (11/2009)     Vasectomy (12/06/1995)     Skin graft (1987)     elbow surgery (1982)  Medications    Allegra, Oral    carbidopa-levodopa 50 mg-200 mg oral tablet, extended release, 1 tab(s), Oral, tid    finasteride 5 mg oral tablet, 5 mg= 1 tab(s), Oral, daily, 3 refills    Flonase 50 mcg/inh nasal spray, 2 spray(s), Nasal, daily, 11 refills    simvastatin 40 mg oral tablet, 40 mg= 1 tab(s), Oral, hs, 3 refills    tamsulosin 0.4 mg oral capsule, 0.4 mg= 1 cap(s), Oral, daily, 3 refills    Viagra 50 mg oral tablet, 50 mg= 1 tab(s), Oral, daily, 5 refills  Allergies    Dust (Sneezing..)    Mold (Sneezing..)    No Known Medication Allergies    Pollen (Sneezing..)  Social History    Smoking Status     Never smoker     Alcohol      Current, 1-2 times per month, 3 drinks/episode average.     Electronic Cigarette/Vaping      Electronic Cigarette Use: Never.     Employment/School      Employed, Work/School description:  - [employer].     Exercise      Exercise frequency: 3-4 times/week.     Home/Environment      Marital status: . Spouse/Partner name: Yoana. Lives with Spouse.      Nutrition/Health      Type of diet: Regular. Caffeine intake amount: Rare.     Other      Wears glasses.     Sexual      Sexually active: Yes. Sexual orientation: Heterosexual.     Substance Abuse      Never     Tobacco - Denies Tobacco Use      Never (less than 100 in lifetime)  Family History    Patient was adopted  Lab Results          Lab Results (Last 4 results within 90 days)           Sodium Level TR: 138 mEq/L (06/18/21 13:55:00)          Potassium Level TR: 4 mEq/L (06/18/21 13:55:00)          Chloride TR: 105 mEq/L (06/18/21 13:55:00)          CO2 TR: 24 mEq/L (06/18/21 13:55:00)          Anion Gap TR: 9 mEq/L (06/18/21 13:55:00)          Glucose Level TR: 115 mg/dL (06/18/21 13:55:00)          BUN TR: 20 mg/dL (06/18/21 13:55:00)          Creatinine TR: 1.15 mg/dL (06/18/21 13:55:00)          BUN/Creatinine Ratio TR: 17 (06/18/21 13:55:00)          Calcium TR: 9.4 mEq/dL (06/18/21 13:55:00)          Bilirubin Total TR: 0.4 mg/dL (06/18/21 13:55:00)          Alkaline Phosphatase TR: 174 unit/L (06/18/21 13:55:00)          ALT TR: 71 unit/L (06/18/21 13:55:00)          Protein Total TR: 7.9 gm/dL (06/18/21 13:55:00)          Albumin Level TR: 4.5 g/dL (06/18/21 13:55:00)          Globulin TR: 3.4 g/dL (06/18/21 13:55:00)          A/G Ratio TR: 1.3 mg/dL (06/18/21 13:55:00)          WBC TR: 8.7 x10^3/uL (06/18/21 13:55:00)          RBC TR: 5.12 x10^6/uL (06/18/21 13:55:00)          Hgb TR: 15.1 g/dL (06/18/21 13:55:00)          Hct TR: 44.2 % (06/18/21 13:55:00)          MCV TR: 86 fL (06/18/21 13:55:00)          MCH TR: 29.5 pg (06/18/21 13:55:00)          MCHC TR: 34.2 gm/dL (06/18/21 13:55:00)          RDW TR: 14.2 % (06/18/21 13:55:00)          Platelet TR: 302 x10^3/uL (06/18/21 13:55:00)          MPV (Mean Platelet Volume) TR: 9.3 fL (06/18/21 13:55:00)          Lymph Auto TR: 1.5 % (06/18/21 13:55:00)          Mono Auto TR: 0.3 % (06/18/21 13:55:00)          Eosinophils Auto TR: 0.5 % (06/18/21  13:55:00)          Neutrophils Abs# TR: 6.3 % (06/18/21 13:55:00)          Lymphocytes TR: 17 % (06/18/21 13:55:00)          Neutrophils TR: 72 % (06/18/21 13:55:00)          Monocytes TR: 4 % (06/18/21 13:55:00)          Eosinophils TR: 6 % (06/18/21 13:55:00)          Absolute Basophils TR: 0 cells/uL (06/18/21 13:55:00)          Urine Color Dipstick: Yellow (08/26/21 12:28:00)          Urine Appearance: Clear (08/26/21 12:28:00)          Urine pH Dipstick: 5.5 (08/26/21 12:28:00)          Urine Specific Gravity Dipstick: 1.020 (08/26/21 12:28:00)          Urine Glucose Dipstick: Negative (08/26/21 12:28:00)          Urine Bilirubin Dipstick: Negative (08/26/21 12:28:00)          Urine Ketones Dipstick: Negative (08/26/21 12:28:00)          Urine Blood Dipstick: Negative (08/26/21 12:28:00)          Urine Protein Dipstick: Negative (08/26/21 12:28:00)          Urine Nitrite Dipstick: Negative (08/26/21 12:28:00)          Urine Leukocyte Esterase Dipstick: Negative (08/26/21 12:28:00)          Urine Urobilinogen Dipstick: 0.2 mg/dl (08/26/21 12:28:00)          Lyme Ab Scrn: >10.00 High (06/24/21 09:00:00)          Lyme Ab IgM WB: POSITIVE Abnormal [NEGATIVE  - NEGATIVE] (06/24/21 09:00:00)          P18 Ab IgG: NON-REACTIVE (06/24/21 09:00:00)          P23 Ab IgG: REACTIVE Abnormal (06/24/21 09:00:00)          P28 Ab IgG: NON-REACTIVE (06/24/21 09:00:00)          P30 Ab IgG: NON-REACTIVE (06/24/21 09:00:00)          P39 Ab IgG: NON-REACTIVE (06/24/21 09:00:00)          P41 Ab IgG: REACTIVE Abnormal (06/24/21 09:00:00)          P45 Ab IgG: NON-REACTIVE (06/24/21 09:00:00)          P58 Ab IgG: NON-REACTIVE (06/24/21 09:00:00)          P66 Ab IgG: NON-REACTIVE (06/24/21 09:00:00)          P93 Ab IgG: NON-REACTIVE (06/24/21 09:00:00)          P23 Ab IgM: REACTIVE Abnormal (06/24/21 09:00:00)          P39 Ab IgM: REACTIVE Abnormal (06/24/21 09:00:00)          P41 Ab IgM: REACTIVE Abnormal (06/24/21 09:00:00)           Borrelia burgdorferi IgG: NEGATIVE [NEGATIVE  - NEGATIVE] (06/24/21 09:00:00)          Urine Culture: See comment (08/26/21 18:00:00)          POC Test Comments: Performed at [facility] (08/26/21 12:28:00)  Immunizations          Other Immunizations          Administration Dosage Date(s)          influenza virus vaccine, inactivated          11/01/2018          pneumococcal (PPSV23)          10/29/2019          Td          10/18/2004          tetanus/diphth/pertuss (Tdap) adult/adol          12/22/2015          pneumococcal (PCV13)          11/01/2018

## 2022-02-15 NOTE — LETTER
(Inserted Image. Unable to display)   December 21, 2020      GENNA BAILEY      [id number] [place], [place] [id number]        Dear GENNA,    Thank you for selecting [facility] ([facility]viously [facility]) for your healthcare needs.  Below you will find the results of your recent tests done at our clinic.     Results are acceptable.      Result Name Current Result Previous Result Reference Range   Sodium Level (mmol/L)  142 12/18/2020  135 - 146   Potassium Level (mmol/L)  4.3 12/18/2020  3.5 - 5.3   Chloride Level (mmol/L)  104 12/18/2020  98 - 110   CO2 Level (mmol/L)  29 12/18/2020  20 - 32   Glucose Level (mg/dL)  99 12/18/2020 ((H)) 100 10/31/2019 65 - 99   BUN (mg/dL)  23 12/18/2020  7 - 25   Creatinine Level (mg/dL)  1.23 12/18/2020  0.70 - 1.33   eGFR (mL/min/1.73m2)  64 12/18/2020  > OR = 60 -    Calcium Level (mg/dL)  9.6 12/18/2020  8.6 - 10.3   Bilirubin Total (mg/dL)  0.6 12/18/2020  0.2 - 1.2   Alkaline Phosphatase (unit/L)  67 12/18/2020  35 - 144   AST/SGOT (unit/L)  25 12/18/2020  10 - 35   ALT/SGPT (unit/L)  13 12/18/2020  21 10/31/2019 9 - 46   Protein Total (gm/dL)  6.9 12/18/2020  6.1 - 8.1   Albumin Level (gm/dL)  4.6 12/18/2020  3.6 - 5.1   Globulin  2.3 12/18/2020  1.9 - 3.7   Cholesterol (mg/dL)  147 12/18/2020  160 10/31/2019  - <200   Non-HDL Cholesterol  117 12/18/2020  127 10/31/2019  - <130   HDL (mg/dL) ((L)) 30 12/18/2020 ((L)) 33 10/31/2019 > OR = 40 -    Cholesterol/HDL Ratio  4.9 12/18/2020  4.8 10/31/2019  - <5.0   LDL  83 12/18/2020  96 10/31/2019    Triglyceride (mg/dL) ((H)) 242 12/18/2020 ((H)) 215 10/31/2019  - <150   TSH (mIU/L)  2.40 12/18/2020  0.40 - 4.50   PSA (ng/mL)  0.7 12/18/2020  1.3 10/31/2019  - < OR = 4.0   Testosterone Total (ng/dL)  597 12/18/2020  250 - 827   WBC  6.7 12/18/2020  3.8 - 10.8   Hgb (gm/dL)  16.9 12/18/2020  13.2 - 17.1   Platelet  239 12/18/2020  140 - 400       Please contact me or my assistant at  [phone] if you have any questions.     Sincerely,        Charles Churchill MD

## 2022-02-15 NOTE — NURSING NOTE
Placed ABPM on Left arm @ 1032 per JDL for dx White coat Hypertension. Gave patient diary, written and verbal instructions, and told to return in 24 hrs. Patient indicated he understood.

## 2022-02-15 NOTE — PROGRESS NOTES
Chief Complaint    discuss testoterone replacement therapy  History of Present Illness      Patient would like to have his testosterone level tested.  He feels mildly fatigued and weak chronically.  Has a history of erectile dysfunction for which he uses Cialis successfully.  He is being evaluated for deep brain stimulator at [place] probably to be inserted in March.  They started him on Remeron due to sleep issues associated with Parkinson's disease.  They suggested an overnight oximetry as well.  Patient's [place] score is 6.  He snores and feels fatigued.  Review of Systems      No change in weight.  No falls.  No chest pain, dyspnea, edema, headache.  He has nocturia 3-4 times per night.  He has occasional heartburn.  No visual complaints.  Tremor and parkinsonian symptoms unchanged.  No other urinary complaints.  No GI complaints.  No muscle pain, rashes, joint swelling.  Physical Exam   Vitals & Measurements    HR: 76(Peripheral)  BP: 166/77     HT: 66 in  WT: 207 lb  BMI: 33.41       Patient is a healthy-appearing man in no distress.  Minimal tremor.  Minimal bradykinesia.  Eyes appear normal.  H&T exam unremarkable.  Neck supple no thyromegaly.  Chest clear.  Heart exam regular.  No edema.  Abdomen without hepatosplenomegaly.  Assessment/Plan       Benign prostatic hyperplasia (BPH) with urinary urgency         Symptoms unchanged.         Ordered:          PSA, Total (Room)* ([facility]), Specimen Type: Serum, Collection Date: 01/04/18 9:44:00 CST                Dyslipidemia         On statin.  No muscle achiness.         Ordered:          Lipid panel with reflex to direct ldl* ([facility]), Specimen Type: Serum, Collection Date: 01/04/18 9:44:00 CST                ED (erectile dysfunction)         Unchanged.  Seems reasonable to work him up for hypogonadism.                Fatigue         Likely related to Parkinson's.  Rule out hypogonadism, thyroid disease sleep apnea.         Ordered:          CBC (h/h, RBC, indices,  WBC, Plt)* ([facility]), Specimen Type: Blood, Collection Date: 01/04/18 9:44:00 CST          Referral (Request), 01/04/18 9:44:00 CST, Referred to: Other, Referred to: Home sleep test, Reason for referral: HST, Snoring  Fatigue          Testosterone, Total (Males)* ([facility]), Specimen Type: Serum, Collection Date: 01/04/18 9:44:00 CST          TSH* ([facility]), Specimen Type: Serum, Collection Date: 01/04/18 9:44:00 CST                Fatty Liver         Triglycerides remain elevated.         Ordered:          Comprehensive Metabolic Panel* ([facility]), Specimen Type: Serum, Collection Date: 01/04/18 9:44:00 CST                Parkinsonian syndrome         Deep brain stimulator being planned.                Snoring         Minimal daytime sleepiness.  With a home study.         Ordered:          Referral (Request), 01/04/18 9:44:00 CST, Referred to: Other, Referred to: Home sleep test, Reason for referral: HST, Snoring  Fatigue                White coat hypertension         No longer monitors his blood pressure at home and get a 24-hour ambulatory.         Ordered:          Ambulatory BP monitor (Request), White coat hypertension          Comprehensive Metabolic Panel* ([facility]), Specimen Type: Serum, Collection Date: 01/04/18 9:44:00 CST           Patient Information     Name:GENNA BAILEY      Address:      [address] [address]     Sex:Male     YOB: 1960     [phone]     Emergency Contact:DAMARIS BAILEY     MRN:[id number]     FIN:[id number]     [facility]     Date of Service:01/04/2018      Primary Care Physician:       Charles Churchill MD, [phone]  Problem List/Past Medical History    Ongoing     Allergic Rhinitis     Anxiety     Benign prostatic hyperplasia (BPH) with urinary urgency     Depression, Recurrent     Deviated Septum     Dyslipidemia     ED (erectile dysfunction)     Fatty Liver     Metabolic Syndrome     Obesity      Parkinsonian syndrome     White coat hypertension    Historical  Procedure/Surgical History     Colonoscopy (03/06/2013)     Rotator Cuff Repair - Right (12/03/2012)     Complete repair of rotator cuff - Left (11/2009)     Vasectomy (12/06/1995)     Skin graft (1987)     elbow surgery (1982)  Medications        carbidopa-levodopa 50 mg-200 mg oral tablet, extended release: 1 tab(s), po, qid, 0 Refill(s).        entacapone: See Instructions, 100 mg po qid, 0 Refill(s).        Allegra: po.        Flonase 50 mcg/inh nasal spray: 2 spray(s), nasal, daily, 1 EA, 11 Refill(s).        Remeron 30 mg oral tablet: 30 mg, 1 tab(s), po, hs, 0 Refill(s).        simvastatin 40 mg oral tablet: 40 mg, 1 tab(s), PO, Once a day (at bedtime), 90 tab(s), 3 Refill(s).        Cialis 10 mg oral tablet: 10 mg, 1 tab(s), po, daily, 5 tab(s), 11 Refill(s).        tamsulosin 0.4 mg oral capsule: 0.4 mg, 1 cap(s), PO, Daily, 30 minutes after the same meal, 30 cap(s), 11 Refill(s).        Ambien 5 mg oral tablet: 5 mg, 1 tab(s), PO, Once a day (at bedtime), PRN: for sleep, 30 tab(s), 0 Refill(s).                Allergies    Dust (Sneezing..)    Mold (Sneezing..)    No Known Medication Allergies    Pollen (Sneezing..)  Social History    Smoking Status - 01/04/2018     Former smoker     Alcohol - 12/27/2012      Current, 1-2 times per month     Employment and Education - 12/27/2012      Employed, Work/School description:  - [employer].     Exercise and Physical Activity - 12/27/2012      Exercise frequency: 3-4 times/week.     Home and Environment - 12/27/2012      Marital status: . Spouse/Partner name: Yoana. Lives with Spouse.     Nutrition and Health - 12/27/2012      Type of diet: Regular. Caffeine intake amount: Rare.     Other - 12/27/2012      Wears glasses.     Sexual - 12/27/2012      Sexually active: Yes. Sexual orientation: Heterosexual.     Substance Abuse - 12/27/2012      Never     Tobacco - 12/27/2012      Never  Family  History    Patient was adopted  Immunizations      Vaccine Date Status      tetanus/diphth/pertuss (Tdap) adult/adol 12/22/2015 Given      Td 10/18/2004 Recorded  Lab Results      Results (Last 90 days)      No results located.

## 2022-02-15 NOTE — LETTER
(Inserted Image. Unable to display)   November 01, 2019      GENNA LAIMARIAM      [id number] [place], [place] [id number]        Dear GENNA,    Thank you for selecting [facility] ([facility] for your healthcare needs.  Below you will find the results of your recent tests done at our clinic.    Results are acceptable.      Result Name Current Result Previous Result Reference Range   Glucose Level (mg/dL) ((H)) 100 10/31/2019 ((H)) 100 11/29/2018 65 - 99   ALT/SGPT (unit/L)  21 10/31/2019  11 11/29/2018 9 - 46   Cholesterol (mg/dL)  160 10/31/2019 ((H)) 221 11/29/2018  - <200   Non-HDL Cholesterol  127 10/31/2019 ((H)) 195 11/29/2018  - <130   HDL (mg/dL) ((L)) 33 10/31/2019 ((L)) 26 11/29/2018 >40 -    Cholesterol/HDL Ratio  4.8 10/31/2019 ((H)) 8.5 11/29/2018  - <5.0   LDL  96 10/31/2019 ((H)) 139 11/29/2018    Triglyceride (mg/dL) ((H)) 215 10/31/2019 ((H)) 366 11/29/2018  - <150   PSA (ng/mL)  1.3 10/31/2019  0.8 11/29/2018  - < OR = 4.0       Please contact me or my assistant at [phone] if you have any questions.     Sincerely,        Charlse Churchill MD

## 2022-02-15 NOTE — LETTER
(Inserted Image. Unable to display)   November 20, 2019        GENNA BAILEY  [id number] [place], [place] [id number]        Dear GENNA,      Thank you for selecting [facility] for your healthcare needs.    Our records indicate you are due for the following services:     Follow-up office visit     To schedule an appointment or if you have further questions, please contact your primary clinic:   Critical access hospital       [phone]   [facility]       [phone]              [facility]     [phone]      Powered by [employer]    Sincerely,    Charles Churchill M.D., FACP

## 2022-02-15 NOTE — TELEPHONE ENCOUNTER
---------------------  From: Kahlil Abreu CMAssica   Sent: 10/31/2019 8:46:49 AM CDT  Subject: vaccine reaction     Pt stopped at clinic and stated that he had the pneumococcal 23 vaccine on 10/29/19, he stated that his arm around injection site is   swollen and little red, he is wondering if this is a normal reaction or if he should be concerned?    Informed pt that common side effect to any vaccine you can have redness and swelling around injection site, but if he develops  fever, redness is spreading on arm or any new symptoms then he should be evaluated. He verbalized a good understanding and will  continue to monitor.     LOV: 10/29/19 px with MANJINDER Abreu CMA

## 2022-02-15 NOTE — TELEPHONE ENCOUNTER
---------------------  From: Nikkie Newman RN   To: Phone Messages Pool ([id number]_[facility]); Angel Tierney MD;     Cc: Charles Churchill MD;      Sent: 6/28/2021 10:57:28 AM CDT  Subject: Positive  lymes 6/24/21     On doxycycline x 14 days as of 6/24/21         Results:  Date Result Name Ind Value Ref Range   6/24/2021 9:00 AM Lyme Ab Scrn ((H)) >10.00    6/24/2021 9:00 AM Lyme Ab IgM WB ((A)) POSITIVE (NEGATIVE - NEGATIVE)   6/24/2021 9:00 AM P18 Ab IgG  NON-REACTIVE    6/24/2021 9:00 AM P23 Ab IgG ((A)) REACTIVE    6/24/2021 9:00 AM P28 Ab IgG  NON-REACTIVE    6/24/2021 9:00 AM P30 Ab IgG  NON-REACTIVE    6/24/2021 9:00 AM P39 Ab IgG  NON-REACTIVE    6/24/2021 9:00 AM P41 Ab IgG ((A)) REACTIVE    6/24/2021 9:00 AM P45 Ab IgG  NON-REACTIVE    6/24/2021 9:00 AM P58 Ab IgG  NON-REACTIVE    6/24/2021 9:00 AM P66 Ab IgG  NON-REACTIVE    6/24/2021 9:00 AM P93 Ab IgG  NON-REACTIVE    6/24/2021 9:00 AM P23 Ab IgM ((A)) REACTIVE    6/24/2021 9:00 AM P39 Ab IgM ((A)) REACTIVE    6/24/2021 9:00 AM P41 Ab IgM ((A)) REACTIVE    6/24/2021 9:00 AM Borrelia burgdorferi IgG  NEGATIVE (NEGATIVE - NEGATIVE)---------------------  From: Angel Tierney MD   To: Nikkie Newman RN;     Sent: 6/28/2021 11:44:51 AM CDT  Subject: RE: Positive  lymes 6/24/21     Continue with doxycyclinepatient advised.

## 2022-02-15 NOTE — TELEPHONE ENCOUNTER
Entered by Teresa Damon on 2020 3:30:49 PM CDT  Hi Anand,    It looks like you are due for your annual physical with Dr. Churchill. However, I can refill this medication for a month until you can get in to see him. Please verify that you want your prescription to be sent to [facility]. Also, please call us at [phone] to schedule your appointment.     Teresa       ---------------------  From: GENNA MONCADA  To: [facility]  Sent: 10/06/2020 03:24 p.m. CDT  Subject: Prescription renewal  This message is for Dr. Churchill.    My prescription for Simvastatin 40mg has . Can Dr. Churchill renew it for me or do I need to make an appointment to see him first?    Thank you.  Anand Moncada---------------------  From: Teresa Damon (Phone Messages Pool [id number]_[facility]))   To: GENNA MONCADA    Sent: 10/6/2020 3:30:55 PM CDT  Subject: FW: Prescription renewal

## 2022-02-15 NOTE — TELEPHONE ENCOUNTER
---------------------  From: Charles Churchill MD   To: Phone Messages Pool ([id number]_[place] [facility] [place]);     Sent: 10/8/2020 9:09:30 AM CDT  Subject: RE: Prescription Extension     done      ---------------------  From: Rylan/Teresa CRABTREE (Phone Messages Pool ([id number]_[place] [facility] [place]))   To: Charles Churchill MD;     Sent: 10/6/2020 4:06:29 PM CDT  Subject: FW: Prescription Extension     Please advise if ok to fill.       ---------------------  From: GENNA BAILEY  [facility]  Sent: 10/06/2020 04:02 p.m. CDT  Subject: Prescription Extension  Hello,    I currently have an active prescription for Tamsulosin 0.4MG from Dr. Terrance Santos. I have one 30 day refill left but am leaving [facility] for 2 months and would like one additional refill to cover my trip. Or if preferred just contact [facility] and prescribe a 90 day supply.    I have scheduled an appointment with my regular physician Dr. Churchill for December 14th, at which time I will ask him to refill the prescription.    Are you able to do this?    Thank you,    Anand

## 2022-02-15 NOTE — LETTER
(Inserted Image. Unable to display)                                                                                                [address] [facility] [address]                                                August 26, 2020Re: GENNA EASTONSHREYA1960 [facility] [address] [place] [address]Ow:  [place] [facility]The following patient has been referred to your office/practice:  GENNA LYNN Appointment:  Appointment is PendingPlease refer to the attached  clinical documentation for a summary of GENNA's care.  Please do not hesitate to contact our office if any additional clinical questions arise.  All relevant records and transition of care documents should be mailed or faxed.Your assistance in providing continuity of care is appreciated. Sincerely, [facility] [place] [place] [address](P) [phone](F) [phone]

## 2022-02-15 NOTE — LETTER
(Inserted Image. Unable to display)     January 05, 2021      GENNA BAILEY  [id number] [place], [place] [id number]          Dear GENNA,      Thank you for selecting [facility] ([facility] [place]) for your healthcare needs.    Our records indicate you are due for the following services:     Hypertension check ~ please remember to bring your at-home blood pressure readings with you to your appointment.     (FYI   Regarding office visits: In some instances, a video visit or telephone visit may be offered as an option.)      To schedule an appointment or if you have further questions, please contact your clinic at [phone].      Powered by [employer]    Sincerely,    Charles Churchill MD, FACP

## 2022-02-15 NOTE — NURSING NOTE
Straight cath via sterile procedure for residual urine done per BR for symptom of urine retention.  Glans penis is cleansed x 3 with Betadine swabs. Bladder is accessed without difficulty using Fr 14 straight catheter.   600 cc of clear yellow urine is obtained. The patient tolerated the procedure with out difficulty.Advised to observe for signs and symptoms of UTI these reviewed. Advised to return to clinic if unable to void or concerns with urine or bladder emptying.

## 2022-02-15 NOTE — PROGRESS NOTES
Chief Complaint    ED f/u- Rash and Fever  History of Present Illness       Rancho is a 61-year-old with a history of Parkinson's disease, whitecoat hypertension, erectile dysfunction who presents for follow-up of an ED visit on June 18.  He presented to the ED with multiple red blanching ovoid skin lesions and a week of fever.  Removed a deer tick 6 or 8 weeks ago.  He has been treated with doxycycline and the rash and fever promptly resolved.  He has been physically active.  He is feeling well.  He spent the winter in his [facility].  He brings home blood pressure readings showing normal blood pressure.  He would like to switch back to Viagra for erectile dysfunction as the Cialis gives him nausea.  Review of Systems       No swollen joints.  Headache resolved.  No neurologic symptoms.  Fever and chills resolved as is rash..  Physical Exam   Vitals & Measurements    T: 98.1  F (Tympanic)  HR: 67 (Peripheral)  BP: 151/79     WT: 196.4 lb        Patient is a healthy-appearing man in no distress.  Alert and oriented.  In good spirits.  Skin is free of rash.  Device site in the left upper chest without tenderness, warmth, or redness.  No swollen joints.  Chest clear.  Cardiac exam regular no murmur.  No edema.  Abdomen nontender.  Assessment/Plan       Acute Lyme disease (A69.20)          Resolving.  We will continue doxycycline for 21 days.  Lyme test today.         Ordered:          72887 office o/p est mod 30-39 min (Charge), Quantity: 1, Acute Lyme disease  White coat syndrome without hypertension  ED (erectile dysfunction)          Lyme disease antibody, total, eia with reflex to western blot (igg,igm)* ([employer]), Specimen Type: Serum, Collection Date: 06/24/21 8:52:00 CDT                ED (erectile dysfunction) (N52.9)          We will switch to Viagra.         Ordered:          60264 office o/p est mod 30-39 min (Charge), Quantity: 1, Acute Lyme disease  White coat syndrome without  hypertension  ED (erectile dysfunction)                White coat syndrome without hypertension (R03.0)          Home blood pressure readings are normal.         Ordered:          10306 office o/p est mod 30-39 min (Charge), Quantity: 1, Acute Lyme disease  White coat syndrome without hypertension  ED (erectile dysfunction)                Orders:         doxycycline, = 1 tab(s) ( 100 mg ), Oral, bid, x 14 day(s), # 28 tab(s), 0 Refill(s), Type: Acute, Pharmacy: [id number], 1 tab(s) Oral bid,x14 day(s), 66, in, 12/18/20 9:11:00 CST, Height Measured, 196.4, lb, 06/24/21 8:30:00 CDT, Weight Measured, (Ordered)         sildenafil, = 1 tab(s) ( 50 mg ), Oral, daily, Instructions: 1 hour before sexual activity, # 60 tab(s), 5 Refill(s), Type: Maintenance, (Ordered)  Patient Information     Name:GENNA BAILEY      Address:      [address] [id number]     Sex:Male     YOB: 1960     [phone]     Emergency Contact:DAMARIS BAILEY     MRN:[id number]     FIN:[id number]     Location:[facility]     Date of Service:06/24/2021      Primary Care Physician:       Charles Churchill MD, [phone]      Attending Physician:       Charles Churchill MD, [phone]  Problem List/Past Medical History    Ongoing     Allergic Rhinitis     Anxiety     Benign prostatic hyperplasia (BPH) with urinary urgency     CNS device complication     Depression, Recurrent     Deviated Septum     Dyslipidemia       Comments: 10 year estimated CV risk 7.6%. Antihpertnsive med not indicated for 24 hour average /73     ED (erectile dysfunction)     Fatty Liver     Metabolic Syndrome     Obesity     Parkinsonian syndrome     S/P deep brain stimulator placement     White coat syndrome without hypertension    Historical     No qualifying data  Procedure/Surgical History     Polysomnogram (02/05/2018)      Comments: AHI: 3.0.     Colonoscopy (03/06/2013)      Comments: Right  sided diverticulosis otherwise normal. Repeat 10 years..     Rotator Cuff Repair - Right (12/03/2012)     Complete repair of rotator cuff - Left (11/2009)     Vasectomy (12/06/1995)     Skin graft (1987)     elbow surgery (1982)  Medications    Allegra, Oral    carbidopa-levodopa 50 mg-200 mg oral tablet, extended release, 1 tab(s), Oral, tid    doxycycline hyclate 100 mg oral tablet, 100 mg= 1 tab(s), Oral, bid    Flonase 50 mcg/inh nasal spray, 2 spray(s), Nasal, daily, 11 refills    simvastatin 40 mg oral tablet, 40 mg= 1 tab(s), Oral, hs, 3 refills    tamsulosin 0.4 mg oral capsule, 0.4 mg= 1 cap(s), Oral, daily, 3 refills    Viagra 50 mg oral tablet, 50 mg= 1 tab(s), Oral, daily, 5 refills  Allergies    Dust (Sneezing..)    Mold (Sneezing..)    No Known Medication Allergies    Pollen (Sneezing..)  Social History    Smoking Status     Never smoker     Alcohol      Current, 1-2 times per month, 3 drinks/episode average.     Electronic Cigarette/Vaping      Electronic Cigarette Use: Never.     Employment/School      Employed, Work/School description:  - [employer].     Exercise      Exercise frequency: 3-4 times/week.     Home/Environment      Marital status: . Spouse/Partner name: Yoana. Lives with Spouse.     Nutrition/Health      Type of diet: Regular. Caffeine intake amount: Rare.     Other      Wears glasses.     Sexual      Sexually active: Yes. Sexual orientation: Heterosexual.     Substance Abuse      Never     Tobacco - Denies Tobacco Use      Never (less than 100 in lifetime)  Family History    Patient was adopted  Immunizations          Other Immunizations          Administration Dosage Date(s)          influenza virus vaccine, inactivated          11/01/2018          pneumococcal (PPSV23)          10/29/2019          Td          10/18/2004          tetanus/diphth/pertuss (Tdap) adult/adol          12/22/2015          pneumococcal (PCV13)          11/01/2018  Diagnostic Results    From the  emergency department June 18, 2021 basic metabolic profile lactate CBC unremarkable.  ALT was 71+174 LFTs otherwise normal procalcitonin 0.34 CRP 1.26 sed rate 21.

## 2022-02-15 NOTE — TELEPHONE ENCOUNTER
---------------------  From: Perla Santo MA (Phone Messages Pool ([id number]_[facility]))   To: [facility] Message Pool ([id number]_[place]);     Sent: 8/26/2021 5:01:38 PM CDT  Subject: Urinary issues     Pt called at 1653 c/o having issues w/ urination---not able to urinate, having a weak stream.  He also had similar sx 1 yr ago, seen by GISELM 8/25/2020.  Had US done at time to measure bladder volume and was also cathed.  Pt transferred to scheduling and has appt deborah/ LENORA.

## 2022-02-15 NOTE — RESULTS
Patient Information     Name:GENNA BAILEY      Address:      [facility] [address] [place] [id number]     Sex:Male     YOB: 1960     [phone]     Emergency Contact:DAMARIS BAILEY     MRN:[id number]     FIN:[id number]     Location:[facility]     Date of Service:10/29/2019      Primary Care Physician:       Charles Churchill MD, [phone]      Attending Physician:       Charles Churchill MD, [phone]   Indication: Dyspnea on exertion   Findings: 60 Hz artifact otherwise normal EKG   Impression: Normal EKG

## 2022-02-15 NOTE — TELEPHONE ENCOUNTER
---------------------  From: Marah Smith LPN (Phone Messages Pool ([id number]_[facility]))   To: [facility] Message Pool ([id number]_[facility]); GENNA MONCADA    Sent: 12/10/2021 2:53:50 PM CST  Subject: CONSUMER MESSAGEFW: MRI Request     Hi Anand,    Dr. Churchill is out of clinic today. I will forward this message for him to receive Monday when he is back.    Thanks,  Marah GALLOWAY LPN        ---------------------  From: GENNA MONCADA  To: [facility]  Sent: 12/10/2021 02:47 p.m. CST  Subject: MRI Request  This message is for Dr. Churchill,    I have been having back trouble for about 8 weeks now and saw a doctor at the [facility] here in [place] where I am wintering. He ordered a lumbar MRI for me, however I can t get in until February 14th of 2022.    I was wondering if you could write an order for a lumbar spine MRI at [facility] in [place] for me? I am planning on being back from Dec 17th through the 30th. I have contacted them to verify openings and they do have some but need an order.    I will need a sedative because I am claustrophobic. They also need to be aware of my Medtronic Deep Brain Stimulater and my recent Urolift procedure of the prostate, there are some MRI restrictions with this procedure.    I understand if you can t write the order without having seen me for this issue, no worries there. I just hoped the was a chance of getting this done while I am home for [place].    Thank you,  Anand Moncada---------------------  From: Belkis Mcnamara (Care Team Connect Message Pool ([id number]_[facility] [place]))   To: Charles Churchill MD;     Sent: 12/13/2021 10:35:18 AM CST  Subject: FW: CONSUMER MESSAGEFW: MRI Request---------------------  From: Charles Churchill MD   To: [facility] Message Pool ([id number]_[facility]);     Cc: Referral Coordinators Pool ([id number]_[facility] [place]);      Sent: 12/13/2021 1:12:43 PM CST  Subject: RE: CONSUMER MESSAGEFW: MRI Request     MRI order in [facility]. Where would he like me to send  Lorazepakm prescription?---------------------  From: Belkis Mcnamara ([facility] Message Pool ([id number]_[facility]))   To: GENNA BAILEY    Sent: 12/13/2021 5:01:57 PM CST  Subject: FW: CONSUMER MESSAGEFW: MRI Request     Anand, where would you like the medication to be sent?     We will fix the MRI order to [facility] in [place] for you.

## 2022-02-15 NOTE — PROGRESS NOTES
Chief Complaint    sleep study refuls  History of Present Illness      Patient here for follow-up of home sleep test, amatory blood pressure, and lab work for history of Parkinson's disease chronic fatigue erectile dysfunction dyslipidemia.  Liver disease and whitecoat hypertension.  Review of Systems      No falls, headache, chest pain, dyspnea, edema.  He is able to have erections with Cialis.  He has made some dietary changes after his lab work.  Physical Exam   Vitals & Measurements    HR: 63(Peripheral)  BP: 142/74     HT: 66 in  WT: 207 lb  BMI: 33.41       Patient appears comfortable.  Alert and oriented.  No tremor.  H&T exam unremarkable.  Neck supple no thyromegaly.  Chest clear.  Cardiac exam regular.  Extremities have no edema.  Assessment/Plan       Chronic fatigue         Likely due to Parkinson's testosterone level is normal home sleep test does not support a diagnosis of obstructive sleep apnea we discussed the indications for an in lab test to proceed at this time.       Dyslipidemia        Chronically low HDL with high triglycerides on statin drug with controlled LDL.  We discussed additional dietary and pharmacologic measures.       ED (erectile dysfunction)         On Cialis.  I suggested he try doubling the dose from 10-20.       Fatty Liver         ALT is normal.       Parkinsonian syndrome         Patient will be having a deep brain stimulator next month.       White coat hypertension         Hypertensive medications not indicated at this time.  Patient Information     Name:GENNA BAILEY      Address:      [address] [address]     Sex:Male     YOB: 1960     [phone]     Emergency Contact:DAMARIS BAILEY     MRN:[id number]     FIN:[id number]     Location:[facility]     Date of Service:02/16/2018      Primary Care Physician:       Charles Churchill MD, [phone]  Problem List/Past Medical History    Ongoing     Allergic  Rhinitis     Anxiety     Benign prostatic hyperplasia (BPH) with urinary urgency     Depression, Recurrent     Deviated Septum     Dyslipidemia      Comments: 10 year estimated CV risk 7.6%. Antihpertnsive med not indicated for 24 hour average /73     ED (erectile dysfunction)     Fatty Liver     Metabolic Syndrome     Obesity     Parkinsonian syndrome     White coat hypertension    Historical  Procedure/Surgical History     Colonoscopy (03/06/2013)     Rotator Cuff Repair - Right (12/03/2012)     Complete repair of rotator cuff - Left (11/2009)     Vasectomy (12/06/1995)     Skin graft (1987)     elbow surgery (1982)  Medications        carbidopa-levodopa 50 mg-200 mg oral tablet, extended release: 1 tab(s), po, qid, 0 Refill(s).        entacapone: See Instructions, 100 mg po qid, 0 Refill(s).        Allegra: po.        Flonase 50 mcg/inh nasal spray: 2 spray(s), nasal, daily, 1 EA, 11 Refill(s).        Remeron 30 mg oral tablet: 30 mg, 1 tab(s), po, hs, 0 Refill(s).        simvastatin 40 mg oral tablet: 40 mg, 1 tab(s), PO, Once a day (at bedtime), 90 tab(s), 3 Refill(s).        Cialis 10 mg oral tablet: 10 mg, 1 tab(s), po, daily, 5 tab(s), 11 Refill(s).        tamsulosin 0.4 mg oral capsule: 0.4 mg, 1 cap(s), PO, Daily, 30 minutes after the same meal, 30 cap(s), 11 Refill(s).        Ambien 5 mg oral tablet: 5 mg, 1 tab(s), PO, Once a day (at bedtime), PRN: for sleep, 30 tab(s), 0 Refill(s).                Allergies    Dust (Sneezing..)    Mold (Sneezing..)    No Known Medication Allergies    Pollen (Sneezing..)  Social History    Smoking Status - 02/16/2018     Former smoker     Alcohol - 12/27/2012      Current, 1-2 times per month     Employment and Education - 12/27/2012      Employed, Work/School description:  - [employer].     Exercise and Physical Activity - 12/27/2012      Exercise frequency: 3-4 times/week.     Home and Environment - 12/27/2012      Marital status: . Spouse/Partner name:  Yoana. Lives with Spouse.     Nutrition and Health - 12/27/2012      Type of diet: Regular. Caffeine intake amount: Rare.     Other - 12/27/2012      Wears glasses.     Sexual - 12/27/2012      Sexually active: Yes. Sexual orientation: Heterosexual.     Substance Abuse - 12/27/2012      Never     Tobacco - 12/27/2012      Never  Family History    Patient was adopted  Immunizations      Vaccine Date Status      tetanus/diphth/pertuss (Tdap) adult/adol 12/22/2015 Given      Td 10/18/2004 Recorded  Lab Results      Results (Last 90 days)                Laboratory                     Chemistry                          General Chemistry                               A/G Ratio:      1.8   (01/04/18 10:12 AM CST)                                                                                                                                          ALT/SGPT:      13 unit/L  (01/04/18 10:12 AM CST)                                                                                                                                          AST/SGOT:      H 41 unit/L (Range 10 - 35)  (01/04/18 10:12 AM CST)                                                                                                                                          Albumin Level:      4.5 gm/dL  (01/04/18 10:12 AM CST)                                                                                                                                          Alkaline Phosphatase:      61 unit/L  (01/04/18 10:12 AM CST)                                                                                                                                          BUN:      24 mg/dL  (01/04/18 10:12 AM CST)                                                                                                                                          BUN/Creat Ratio:      NOT APPLICABLE   (01/04/18 10:12 AM CST)                                                                                                                                           Basic Metabolic Profile:         (01/04/18 10:12 AM CST)                                                                                                                                          Bilirubin Total:      0.7 mg/dL  (01/04/18 10:12 AM CST)                                                                                                                                          CO2 Level:      26 mmol/L  (01/04/18 10:12 AM CST)                                                                                                                                          Calcium Level:      9.4 mg/dL  (01/04/18 10:12 AM CST)                                                                                                                                          Chloride Level:      104 mmol/L  (01/04/18 10:12 AM CST)                                                                                                                                          Creatinine Level:      1.21 mg/dL  (01/04/18 10:12 AM CST)                                                                                                                                          Globulin:      2.5   (01/04/18 10:12 AM CST)                                                                                                                                          Glucose Level:      99 mg/dL  (01/04/18 10:12 AM CST)                                                                                                                                          Potassium Level:      4.3 mmol/L  (01/04/18 10:12 AM CST)                                                                                                                                          Protein Total:      7.0 gm/dL  (01/04/18 10:12 AM CST)                                                                                                                                           Sodium Level:      138 mmol/L  (01/04/18 10:12 AM CST)                                                                                                                                          eGFR:      67 mL/min/1.73m2  (01/04/18 10:12 AM CST)                                                                                                                                          eGFR African American:      78 mL/min/1.73m2  (01/04/18 10:12 AM CST)                                                                                                                                     Lipids                               Cholesterol:      143 mg/dL  (01/04/18 10:12 AM CST)                                                                                                                                          Cholesterol/HDL Ratio:      H 5.7  (Range  - <5.0)  (01/04/18 10:12 AM CST)                                                                                                                                          HDL:      L 25 mg/dL (Range >40 - )  (01/04/18 10:12 AM CST)                                                                                                                                          LDL:      85   (01/04/18 10:12 AM CST)                                                                                                                                          Lipid Profile:         (01/04/18 10:12 AM CST)                                                                                                                                          Non-HDL Cholesterol:      118   (01/04/18 10:12 AM CST)                                                                                                                                          Triglyceride:      H 251 mg/dL (Range  - <150)  (01/04/18 10:12 AM CST)                                                                                                                                      Other Chemistry                               PSA                                        (01/04/18 10:12 AM CST)                                                                                                                                                0.6 ng/mL  (01/04/18 10:12 AM CST)                                                                                                                                          Testosterone Total                                        (01/04/18 10:12 AM CST)                                                                                                                                                319 ng/dL  (01/04/18 10:12 AM CST)                                                                                                                                     Thyroid Studies                               TSH                                        (01/04/18 10:12 AM CST)                                                                                                                                                2.20 mIU/L  (01/04/18 10:12 AM CST)                                                                                                                                Hematology                          CBC                               CBC w/Diff:         (01/04/18 10:12 AM CST)                                                                                                                                          Hct:      47.5 %  (01/04/18 10:12 AM CST)                                                                                                                                          Hgb:      16.0 gm/dL  (01/04/18 10:12 AM CST)                                                                                                                                          MCH:      29.4 pg  (01/04/18 10:12  AM CST)                                                                                                                                          MCHC:      33.7 gm/dL  (01/04/18 10:12 AM CST)                                                                                                                                          MCV:      87.2 fL  (01/04/18 10:12 AM CST)                                                                                                                                          MPV:      10.1 fL  (01/04/18 10:12 AM CST)                                                                                                                                          Platelet:      237   (01/04/18 10:12 AM CST)                                                                                                                                          RBC:      5.45   (01/04/18 10:12 AM CST)                                                                                                                                          RDW:      13.4 %  (01/04/18 10:12 AM CST)                                                                                                                                          WBC:      6.1   (01/04/18 10:12 AM CST)                                                                                                                    Diagnostic Results   Home sleep test reveals an AHI of oximetry kristi of 87%.

## 2022-02-15 NOTE — PROGRESS NOTES
Patient:   GENNA BAILEY            MRN: [id number]            FIN: [id number]               Age:   60 years     Sex:  Male     :  1960   Associated Diagnoses:   Acute urinary retention; Parkinsonian syndrome; Urinary frequency   Author:   Terrance Santos MD      Visit Information      Date of Service: 2020 09:54 am  Performing Location: [facility]  Encounter#: [id number]      Primary Care Provider (PCP):  Charles Churchill MD    NPI# [id number]      Referring Provider:  Terrance Santos MD    NPI# [id number]      Chief Complaint   2020 10:07 AM CDT   issues with urination x Sun night - only able to go small amts at a time.  Needing to force at times              Additional Information:No additional information recorded during visit.   Chief complaint and symptoms as noted above and confirmed with patient.  Recent lab and diagnostic studies reviewed with patient      History of Present Illness   2020: Anand presents to clinic with 3-day history of acute urinary changes.  Describing only able to dribble small amounts of urine without complete voiding.  Feels like his bladder is full.  Denies any fevers or chills.  No dysuria.  No similar episodes in the past.  No known history of acute urinary retention.  Longstanding history of Parkinson s disease with deep brain stimulator placement.  On high-dose levodopa carbidopa though has been on same dosage for quite some time.  No recent medication changes.          Review of Systems   Constitutional:  No fever, No chills.    Eye:  Negative except as documented in history of present illness.    Ear/Nose/Mouth/Throat:  Negative except as documented in history of present illness.    Respiratory:  No shortness of breath.    Cardiovascular:  No chest pain, No palpitations, No peripheral edema, No syncope.    Gastrointestinal:  No nausea, No vomiting, No abdominal pain.    Genitourinary:  Change in urine stream, No dysuria, No hematuria.     Hematology/Lymphatics:  Negative except as documented in history of present illness.    Endocrine:  No excessive thirst, No polyuria.    Immunologic:  No recurrent fevers.    Musculoskeletal:  No joint pain, No muscle pain.    Neurologic:  Alert and oriented X4, No numbness, No tingling, No headache.       Health Status   Allergies:    Allergic Reactions (Selected)  Severity Not Documented  Dust (Sneezing..)  Mold (Sneezing..)  No Known Medication Allergies  Pollen (Sneezing..)   Medications:  (Selected)   Prescriptions  Prescribed  Cialis 20 mg oral tablet: = 1 tab(s) ( 20 mg ), po, daily, # 88 tab(s), 3 Refill(s), Type: Maintenance  Flonase 50 mcg/inh nasal spray: 2 spray(s), nasal, daily, # 1 EA, 11 Refill(s), Type: Maintenance, Pharmacy: [facility] #[id number], 2 spray(s) nasal daily  simvastatin 40 mg oral tablet: = 1 tab(s) ( 40 mg ), PO, Once a day (at bedtime), # 90 tab(s), 3 Refill(s), Type: Maintenance, Pharmacy: [facility] #[id number], 1 tab(s) Oral hs  tamsulosin 0.4 mg oral capsule: = 1 cap(s) ( 0.4 mg ), Oral, daily, # 30 cap(s), 2 Refill(s), Type: Maintenance, Pharmacy: [facility] #[id number], 1 cap(s) Oral daily, 66, in, 10/29/19 15:48:00 CDT, Height Measured, 204.8, lb, 08/25/20 10:07:00 CDT, Weight Measured  Documented Medications  Documented  Allegra: po, 0 Refill(s), Type: Maintenance  carbidopa-levodopa 50 mg-200 mg oral tablet, extended release: 1 tab(s), po, tid, 0 Refill(s), Type: Maintenance,    Medications          *denotes recorded medication          *carbidopa-levodopa 50 mg-200 mg oral tablet, extended release: 1 tab(s), po, tid, 0 Refill(s).          *Allegra: po.          Flonase 50 mcg/inh nasal spray: 2 spray(s), nasal, daily, 1 EA, 11 Refill(s).          simvastatin 40 mg oral tablet: 40 mg, 1 tab(s), PO, Once a day (at bedtime), 90 tab(s), 3 Refill(s).          Cialis 20 mg oral tablet: 20 mg, 1 tab(s), po, daily, 88 tab(s), 3 Refill(s).          tamsulosin 0.4 mg oral  capsule: 0.4 mg, 1 cap(s), Oral, daily, 30 cap(s), 2 Refill(s).       Problem list:    All Problems  Allergic Rhinitis / ICD-9-.9 / Confirmed  Anxiety / ICD-9-.00 / Confirmed  Depression, Recurrent / ICD-9-.30 / Confirmed  Deviated Septum / ICD-9- / Confirmed  Dyslipidemia / ICD-9-.4 / Confirmed  Fatty Liver / ICD-9-.8 / Confirmed  S/P deep brain stimulator placement / SNOMED CT 895159536 / Confirmed  ED (erectile dysfunction) / SNOMED CT 5110544137 / Confirmed  Metabolic Syndrome / ICD-9-.7 / Confirmed  CNS device complication / SNOMED CT 886573841 / Confirmed  Benign prostatic hyperplasia (BPH) with urinary urgency / SNOMED CT 8343472441 / Confirmed  Obesity / ICD-9-.00 / Probable  Parkinsonian syndrome / SNOMED CT 30981609 / Confirmed  White coat hypertension / ICD-9-.9 / Confirmed      Histories   Past Medical History:    Active  Dyslipidemia (272.4): Onset on 1/16/2018 at 57 years.  Comments:  1/16/2018 CST 3:21 PM Charles Morfin MD  10 year estimated CV risk 7.6%. Antihpertnsive med not indicated for 24 hour average /73  Metabolic Syndrome (277.7)  Fatty Liver (571.8)  Allergic Rhinitis (477.9)  Anxiety (300.00)  Depression, Recurrent (296.30)  Deviated Septum (470)   Family History:    Patient was adopted.    Procedure history:    Polysomnogram (134863620) on 2/5/2018 at 57 Years.  Comments:  2/18/2019 8:24 AM Carla Grimes CMA  AHI: 3.0  Colonoscopy (426869798) on 3/6/2013 at 52 Years.  Comments:  3/6/2013 9:40 AM Charles Morfin MD  Right sided diverticulosis otherwise normal. Repeat 10 years.  Rotator Cuff Repair - Right (83.63) on 12/3/2012 at 52 Years.  Complete repair of rotator cuff - Left (654453428) in the month of 11/2009 at 49 Years.  Vasectomy (30923397) on 12/6/1995 at 35 Years.  Skin graft (66786731) in 1987 at 27 Years.  elbow surgery in 1982 at 22 Years.   Social History:        Alcohol Assessment             Current, 1-2 times per month, 3 drinks/episode average.      Tobacco Assessment            Never      Substance Abuse Assessment            Never      Employment and Education Assessment            Employed, Work/School description:  - [employer].      Home and Environment Assessment            Marital status: .  Spouse/Partner name: Yoana.  Lives with Spouse.      Nutrition and Health Assessment            Type of diet: Regular.  Caffeine intake amount: Rare.      Exercise and Physical Activity Assessment            Exercise frequency: 3-4 times/week.      Sexual Assessment            Sexually active: Yes.  Sexual orientation: Heterosexual.      Other Assessment            Wears glasses.        Physical Examination   vital signs stable, as noted above   Vital Signs   8/25/2020 11:20 AM CDT Systolic Blood Pressure 150 mmHg  HI    Diastolic Blood Pressure 90 mmHg  HI    Mean Arterial Pressure 110 mmHg    BP Site Right arm    BP Method Manual   8/25/2020 10:07 AM CDT Temperature Tympanic 97.3 DegF  LOW    Peripheral Pulse Rate 80 bpm    Systolic Blood Pressure 170 mmHg  HI    Diastolic Blood Pressure 90 mmHg  HI    Mean Arterial Pressure 117 mmHg      Measurements from flowsheet : Measurements   8/25/2020 10:07 AM CDT   Weight Measured - Standard                204.8 lb     General:  Alert and oriented, No acute distress.    Eye:  Extraocular movements are intact.    HENT:  Normocephalic.    Neck:  Supple, No carotid bruit.    Respiratory:  Lungs are clear to auscultation, Respirations are non-labored.    Cardiovascular:  Normal rate, No murmur, No edema.    Gastrointestinal:  Soft, Non-tender, significant firmness and discomfort with suprapubic palpation.    Musculoskeletal:  Normal range of motion, Normal strength, No tenderness.    Neurologic:  Alert, Oriented, Normal motor function, No focal deficits.    Cognition and Speech:  Oriented, Speech clear and coherent.    Psychiatric:  Appropriate mood & affect.        Review / Management   Results review:  Lab results   8/25/2020 10:14 AM CDT UA pH 5.5    UA Specific Gravity 1.010    UA Glucose NEGATIVE    UA Bilirubin NEGATIVE    UA Ketones NEGATIVE    U Occult Blood NEGATIVE    UA Protein NEGATIVE    UA Nitrite NEGATIVE    UA Leuk Est NEGATIVE   .       Impression and Plan   Diagnosis     Acute urinary retention (NHV93-RS R33.8).     Parkinsonian syndrome (KKU97-JN G20).     Urinary frequency (WPX21-LI R35.0).       .) acute urinary retention, without chronicity - unclear etiology (no recent medication changes, no anesthesia, no narcotics)  - longstanding parkinson's disease and may now have some urological association  - h/o BPH, though denies any more recent symptoms or any steady progression of symptoms  - normal UA, no features of cystitis  - bedside bladder scan measuring ~700cc of urinary retention  - performed straight bladder catheterization with prompt return of clear urine and resolution of symptoms  - will have him start on tamsulosin 0.4mg daily as a trial  - advised that symptoms very well may acutely return, and if so, likely would need to come in for Duron placement  - will refer to urology for acute urinary retention

## 2022-02-15 NOTE — LETTER
(Inserted Image. Unable to display)   December 14, 2021  GENNA BAILEY  [id number] [place] [address]        Dear GENNA,    Thank you for selecting [facility] for your healthcare needs.    Our records indicate you are due for the following services:     Annual Physical     (FYI   Regarding office visits: In some instances, a video visit or telephone visit may be offered as an option.)    To schedule an appointment or if you have further questions, please contact your clinic at [phone].    Powered by [employer]    Sincerely,    Charles Churchill MD, FACP

## 2022-02-15 NOTE — TELEPHONE ENCOUNTER
Urgent order is faxed to [facility] and they will contact patient to schedule.   # [phone] & Fax # [phone].

## 2022-02-15 NOTE — TELEPHONE ENCOUNTER
---------------------  From: Rylan/Teresa CRABTREE (Phone Messages Pool ([id number]_[facility]))   Sent: 4/16/2021 1:32:27 PM CDT  Subject: General Message     Phone Message    PCP: MANJINDER    Time of Call: [id number]    Phone Number: n/a    Returned call at: n/a    Note: Patient calls stating that he found a deer tick on himself and is wondering if he should be seen.    Patient states that he did was the area and pump rubbing alcohol on the area. Patient states that he believes he got all of the tick out of the area.    Advise pt to watch the area and if he develops flu like sx, bullseye caleb, or if area gets warm to the touch he should be seen.    Patient agreed and understood. He had no further questions at that time.

## 2022-02-15 NOTE — PROGRESS NOTES
Chief Complaint    med refill - request a med for sleeping  History of Present Illness      Patient is here needing refills of his simvastatin he has no problems with medication.  He is to be traveling in [place] for 10 days with his wife and wonders if he could get something for sleep he does not sleep at hotels.  He has a history of snoring sleep apnea sleepwalking sleep behavior or falls.  Review of Systems      This is a stable.  No headache, chest pain, edema.  Physical Exam   Vitals & Measurements    HR: 70(Peripheral)  BP: 148/74     HT: 66 in  WT: 200 lb  BMI: 32.28       Alert and oriented.  Healthy-appearing.  Resting tremor right hand.  I see that  what is his name with the scrotum is back, you know whatTitl   Assessment/Plan       Counseling for travel         Prescription for Ambien for travel discussed risks.       Dyslipidemia         Refill simvastatin       Orders:         simvastatin, 1 tab(s) ( 40 mg ), PO, Once a day (at bedtime), # 90 tab(s), 3 Refill(s), Type: Maintenance, Pharmacy: [facility] #[id number], 1 tab(s) po hs         zolpidem, 1 tab(s) ( 5 mg ), PO, Once a day (at bedtime), PRN: for sleep, # 30 tab(s), 0 Refill(s), Type: Maintenance, Pharmacy: [facility] #[id number], 1 tab(s) po hs,PRN:for sleep  Patient Information     Name:GENNA BAILEY      Address:      [address] [address]     Sex:Male     YOB: 1960     [phone]     Emergency Contact:DAMARIS BAILEY     MRN:[id number]     FIN:[id number]     Location:[facility]     Date of Service:10/05/2017      Primary Care Physician:       Charles Churchill MD, [phone]  Problem List/Past Medical History    Ongoing     Allergic Rhinitis     Anxiety     Benign prostatic hyperplasia (BPH) with urinary urgency     Depression, Recurrent     Deviated Septum     Dyslipidemia     ED (erectile dysfunction)     Fatty Liver     Metabolic Syndrome     Obesity     Parkinsonian  syndrome     White coat hypertension    Historical  Procedure/Surgical History     Colonoscopy (03/06/2013)     Rotator Cuff Repair - Right (12/03/2012)     Complete repair of rotator cuff - Left (11/2009)     Vasectomy (12/06/1995)     Skin graft (1987)     elbow surgery (1982)  Medications    Allegra, po    Ambien 5 mg oral tablet, 5 mg= 1 tab(s), po, hs, PRN    carbidopa-levodopa 50 mg-200 mg oral tablet, extended release, 1 tab(s), po, qid    Cialis 10 mg oral tablet, 10 mg= 1 tab(s), po, daily, 11 refills    Flonase 50 mcg/inh nasal spray, 2 spray(s), nasal, daily, 11 refills    simvastatin 40 mg oral tablet, 40 mg= 1 tab(s), po, hs, 3 refills    tamsulosin 0.4 mg oral capsule, 0.4 mg= 1 cap(s), po, daily, 11 refills  Allergies    Dust (Sneezing..)    Mold (Sneezing..)    Pollen (Sneezing..)  Social History    Smoking Status - 10/05/2017     Former smoker     Alcohol - 12/27/2012      Current, 1-2 times per month     Employment and Education - 12/27/2012      Employed, Work/School description:  - [employer].     Exercise and Physical Activity - 12/27/2012      Exercise frequency: 3-4 times/week.     Home and Environment - 12/27/2012      Marital status: . Spouse/Partner name: Yoana. Lives with Spouse.     Nutrition and Health - 12/27/2012      Type of diet: Regular. Caffeine intake amount: Rare.     Other - 12/27/2012      Wears glasses.     Sexual - 12/27/2012      Sexually active: Yes. Sexual orientation: Heterosexual.     Substance Abuse - 12/27/2012      Never     Tobacco - 12/27/2012      Never  Family History    Patient was adopted  Immunizations      Vaccine Date Status      tetanus/diphth/pertuss (Tdap) adult/adol 12/22/2015 Given      Td 10/18/2004 Recorded  Lab Results      Results (Last 90 days)      No results located.

## 2022-02-15 NOTE — NURSING NOTE
Quick Intake Entered On:  12/18/2020 9:11 AM CST    Performed On:  12/18/2020 9:11 AM CST by Nikkie Newman RN               Summary   Height Measured :   66 in(Converted to: 5 ft 6 in, 167.64 cm)    Systolic Blood Pressure :   135 mmHg (HI)    Diastolic Blood Pressure :   78 mmHg   Mean Arterial Pressure :   97 mmHg   Nikkie Newman RN - 12/18/2020 9:11 AM CST

## 2022-02-15 NOTE — PROGRESS NOTES
(Inserted Image. Unable to display)          (Inserted Image. Unable to display)     [address], [place] [address]  Phone [phone]  Fax [phone]      AMBULATORY BLOOD PRESSURE MONITOR    Patient Name:  GENNA BAILEY :  1960          MRN #: [id number]  Date of Exam:   01/15/2018   Ordering HCP:  Charles Churchill MD, FACP      INDICATION:  White-coat hypertension.     BLOOD PRESSURE FINDINGS:    24-hour blood pressure monitor reveals average of 134/73 with preserved dipping.     IMPRESSION:  By new guidelines this would be Stage 1 hypertension.  I reviewed his ten year estimated risk which is 7.6%, therefore antihypertensive medication is not recommended but lifestyle changes are.                                                                             Charles Churchill MD, FACP   Interpreting HCP     MANJINDER/luisana                  D:  2018                                                                          T:  2018      AMBULATORY BLOOD PRESSURE MONITOR REPORT

## 2022-02-15 NOTE — TELEPHONE ENCOUNTER
Entered by Marah Smith LPN on August 27, 2021 3:13:19 PM CDT  ---------------------  From: Marah Smith LPN   To: [facility] #[id number]    Sent: 8/27/2021 3:13:19 PM CDT  Subject: Medication Management     ** Not Approved: Refill not appropriate, Rx sent 8/26/21 **  finasteride (FINASTERIDE 5MG TABLETS)  TAKE 1 TABLET BY MOUTH DAILY  Qty:  90 tab(s)        Days Supply:  90        Refills:  0          Substitutions Allowed     Route To Pharmacy - [facility] #[id number]   Note from Pharmacy:  **Patient requests 90 days supply**  Signed by Marah Smith LPN            ------------------------------------------  From: [facility] #[id number]  To: Saul Roman MD  Sent: August 26, 2021 6:19:10 PM CDT  Subject: Medication Management  Due: August 20, 2021 11:17:32 AM CDT     ** On Hold Pending Signature **     Dispensed Drug: finasteride (finasteride 5 mg oral tablet), TAKE 1 TABLET BY MOUTH DAILY  Quantity: 90 tab(s)  Days Supply: 90  Refills: 0  Substitutions Allowed  Notes from Pharmacy: **Patient requests 90 days supply**  ------------------------------------------

## 2022-02-15 NOTE — NURSING NOTE
Comprehensive Intake Entered On:  12/14/2020 11:05 AM CST    Performed On:  12/14/2020 10:54 AM CST by Rocio Zhang CMA               Summary   Chief Complaint :   Patient presents for annual physical and medication refills. Back stabbing pain, right side swelling and numbness since Thanksgiving may need MRI.   Weight Measured :   199 lb(Converted to: 199 lb 0 oz, 90.265 kg)    Height Measured :   66 in(Converted to: 5 ft 6 in, 167.64 cm)    Body Mass Index :   32.12 kg/m2 (HI)    Body Surface Area :   2.05 m2   Systolic Blood Pressure :   148 mmHg (HI)    Diastolic Blood Pressure :   79 mmHg   Mean Arterial Pressure :   102 mmHg   Peripheral Pulse Rate :   66 bpm   BP Site :   Right arm   BP Method :   Electronic   HR Method :   Electronic   Temperature Tympanic :   97.5 DegF(Converted to: 36.4 DegC)  (LOW)    Rocio Zhang CMA - 12/14/2020 10:54 AM CST   Health Status   Allergies Verified? :   Yes   Medication History Verified? :   Yes   Tobacco Use? :   Never smoker   Rocio Zhang CMA - 12/14/2020 10:54 AM CST   Consents   Consent for Immunization Exchange :   Consent Granted   Consent for Immunizations to Providers :   Consent Granted   Rocio Zhang CMA - 12/14/2020 10:54 AM CST   Meds / Allergies   (As Of: 12/14/2020 11:05:37 AM CST)   Allergies (Active)   Dust  Estimated Onset Date:   Unspecified ; Reactions:   Sneezing.. ; Created By:   Jeanette Manzo; Reaction Status:   Active ; Category:   Environment ; Substance:   Dust ; Type:   Allergy ; Updated By:   Jeanette Manzo; Source:   Other ; Reviewed Date:   12/14/2020 11:01 AM CST      Mold  Estimated Onset Date:   Unspecified ; Reactions:   Sneezing.. ; Created By:   Jeanette Manzo; Reaction Status:   Active ; Category:   Environment ; Substance:   Mold ; Type:   Allergy ; Updated By:   Jeanette Manzo; Source:   Other ; Reviewed Date:   12/14/2020 11:01 AM CST      No Known Medication Allergies  Estimated Onset Date:   Unspecified ; Created By:    Felicity Perez MA; Reaction Status:   Active ; Category:   Drug ; Substance:   No Known Medication Allergies ; Type:   Allergy ; Updated By:   Felicity Perez MA; Reviewed Date:   12/14/2020 11:01 AM CST      Pollen  Estimated Onset Date:   Unspecified ; Reactions:   Sneezing.. ; Created By:   Jeanette Manzo; Reaction Status:   Active ; Category:   Environment ; Substance:   Pollen ; Type:   Allergy ; Updated By:   Jeanette Manzo; Source:   Other ; Reviewed Date:   12/14/2020 11:01 AM [facility]        Medication List   (As Of: 12/14/2020 11:05:37 AM [facility])   Prescription/Discharge Order    fluticasone nasal  :   fluticasone nasal ; Status:   Prescribed ; Ordered As Mnemonic:   Flonase 50 mcg/inh nasal spray ; Simple Display Line:   2 spray(s), nasal, daily, 1 EA, 11 Refill(s) ; Ordering Provider:   Charles Churchill MD; Catalog Code:   fluticasone nasal ; Order Dt/Tm:   12/26/2016 8:54:51 AM CST          simvastatin  :   simvastatin ; Status:   Prescribed ; Ordered As Mnemonic:   simvastatin 40 mg oral tablet ; Simple Display Line:   40 mg, 1 tab(s), PO, Once a day (at bedtime), 90 tab(s), 3 Refill(s) ; Ordering Provider:   Charles Churchill MD; Catalog Code:   simvastatin ; Order Dt/Tm:   10/29/2019 4:06:54 PM CDT          tadalafil  :   tadalafil ; Status:   Prescribed ; Ordered As Mnemonic:   Cialis 20 mg oral tablet ; Simple Display Line:   20 mg, 1 tab(s), po, daily, 88 tab(s), 3 Refill(s) ; Ordering Provider:   Charles Churchill MD; Catalog Code:   tadalafil ; Order Dt/Tm:   10/29/2019 4:08:22 PM CDT          tamsulosin  :   tamsulosin ; Status:   Prescribed ; Ordered As Mnemonic:   tamsulosin 0.4 mg oral capsule ; Simple Display Line:   0.4 mg, 1 cap(s), Oral, daily, for 90 day(s), 90 cap(s), 3 Refill(s) ; Ordering Provider:   Charles Churchill MD; Catalog Code:   tamsulosin ; Order Dt/Tm:   10/8/2020 9:08:40 AM CDT          tamsulosin  :   tamsulosin ; Status:   Prescribed ; Ordered As Mnemonic:   tamsulosin 0.4 mg oral capsule ;  Simple Display Line:   0.4 mg, 1 cap(s), Oral, daily, 30 cap(s), 2 Refill(s) ; Ordering Provider:   Terrance Santos MD; Catalog Code:   tamsulosin ; Order Dt/Tm:   8/25/2020 10:36:37 AM CDT            Home Meds    carbidopa-levodopa  :   carbidopa-levodopa ; Status:   Documented ; Ordered As Mnemonic:   carbidopa-levodopa 50 mg-200 mg oral tablet, extended release ; Simple Display Line:   1 tab(s), po, tid, 0 Refill(s) ; Catalog Code:   carbidopa-levodopa ; Order Dt/Tm:   6/27/2016 2:45:43 PM CDT          fexofenadine  :   fexofenadine ; Status:   Documented ; Ordered As Mnemonic:   Allegra ; Simple Display Line:   po ; Catalog Code:   fexofenadine ; Order Dt/Tm:   2/24/2015 1:23:44 PM CST          gabapentin  :   gabapentin ; Status:   Documented ; Ordered As Mnemonic:   gabapentin 300 mg oral capsule ; Simple Display Line:   300 mg, 1 cap(s), Oral, bid, 0 Refill(s) ; Catalog Code:   gabapentin ; Order Dt/Tm:   12/14/2020 11:02:53 AM CST            ID Risk Screen   Recent Travel History :   Last travel within 7 days   Family Member Travel History :   Last travel within 7 days   Other Exposure to Infectious Disease :   Unknown   Rocio Zhang CMA - 12/14/2020 10:54 AM CST   Social History   Social History   (As Of: 12/14/2020 11:05:37 AM CST)   Alcohol:        Current, 1-2 times per month, 3 drinks/episode average.   (Last Updated: 10/30/2019 12:01:12 PM CDT by Carla Pandey CMA)          Tobacco:  Denies Tobacco Use      Never   (Last Updated: 11/29/2010 2:18:41 PM CST by Charles Churchill MD)   Never (less than 100 in lifetime)   (Last Updated: 12/14/2020 10:55:57 AM CST by Rocio Zhang CMA)          Electronic Cigarette/Vaping:        Electronic Cigarette Use: Never.   (Last Updated: 12/14/2020 10:56:04 AM CST by Rocio Zhang CMA)          Substance Abuse:        Never   (Last Updated: 12/27/2012 11:32:55 AM CST by Nay Chaney)          Employment/School:        Employed, Work/School description:   - [employer].   (Last Updated: 12/27/2012 12:08:52 PM CST by Nay Chaney)          Home/Environment:        Marital status: .  Spouse/Partner name: Sami  Lives with Spouse.   (Last Updated: 12/27/2012 11:44:08 AM CST by Nay Chaney)          Nutrition/Health:        Type of diet: Regular.  Caffeine intake amount: Rare.   (Last Updated: 12/27/2012 12:06:14 PM CST by Nay Chaney)          Exercise:        Exercise frequency: 3-4 times/week.   (Last Updated: 12/27/2012 11:33:11 AM CST by Nay Chaney)          Sexual:        Sexually active: Yes.  Sexual orientation: Heterosexual.   (Last Updated: 12/27/2012 11:33:31 AM CST by Nay Chaney)          Other:        Wears glasses.   (Last Updated: 12/27/2012 12:06:31 PM CST by Nay Chaney)

## 2022-02-15 NOTE — TELEPHONE ENCOUNTER
---------------------  From: Marah Smith LPN (Phone Messages Pool ([id number]_[facility]))   To: GENNA BAILEY    Sent: 9/10/2021 2:31:20 PM CDT  Subject: General Message     Mik Michel,    A prescription for Nasonex was sent to [facility].    Marah C. LPN

## 2022-02-15 NOTE — LETTER
(Inserted Image. Unable to display)       [address] [place] [address]  June 30, 2021      GENNA BAILEY      [id number] [place] [address]        Dear GENNA,    Thank you for selecting [facility] for your healthcare needs.  Below you will find the results of your recent tests done at our clinic.     Consistent with recent Lyme disease. Please call if you are not better.      Result Name Current Result Reference Range   Lyme Ab Scrn ((H)) >10.00 6/24/2021    Lyme Ab IgM WB ((A)) POSITIVE 6/24/2021 NEGATIVE - NEGATIVE   P18 Ab IgG  NON-REACTIVE 6/24/2021    P23 Ab IgG ((A)) REACTIVE 6/24/2021    P28 Ab IgG  NON-REACTIVE 6/24/2021    P30 Ab IgG  NON-REACTIVE 6/24/2021    P39 Ab IgG  NON-REACTIVE 6/24/2021    P41 Ab IgG ((A)) REACTIVE 6/24/2021    P45 Ab IgG  NON-REACTIVE 6/24/2021    P58 Ab IgG  NON-REACTIVE 6/24/2021    P66 Ab IgG  NON-REACTIVE 6/24/2021    P93 Ab IgG  NON-REACTIVE 6/24/2021    P23 Ab IgM ((A)) REACTIVE 6/24/2021    P39 Ab IgM ((A)) REACTIVE 6/24/2021    P41 Ab IgM ((A)) REACTIVE 6/24/2021    Borrelia burgdorferi IgG  NEGATIVE 6/24/2021 NEGATIVE - NEGATIVE       Please contact me or my assistant at [phone] if you have any questions.     Sincerely,        Charles Churchill MD

## 2022-02-15 NOTE — NURSING NOTE
Urine Dipstick POC Entered On:  8/27/2021 12:28 PM CDT    Performed On:  8/26/2021 12:28 PM CDT by Nay Chaney               Urine Dipstick POC   Urine Color Urine Dipstick :   Yellow   Urine Appearance Urine Dipstick :   Clear   Glucose Urine Dipstick :   Negative   Specific Gravity Urine Dipstick :   1.020   Blood Urine Dipstick :   Negative   pH Urine Dipstick :   5.5   Protein Urine Dipstick :   Negative   Bilirubin Urine Dipstick :   Negative   Urobilinogen Urine Dipstick :   0.2 mg/dl   Nitrite Urine Dipstick :   Negative   Leukocytes Urine Dipstick :   Negative   Ketones Urine Dipstick :   Negative   POC Test Comments :   Performed at [facility]   Nay Chaney  8/27/2021 12:28 PM CDT

## 2022-02-15 NOTE — NURSING NOTE
Comprehensive Intake Entered On:  6/24/2021 8:35 AM CDT    Performed On:  6/24/2021 8:30 AM CDT by Teresa Damon               Summary   Chief Complaint :   ED f/u- Rash and Fever    Weight Measured :   196.4 lb(Converted to: 196 lb 6 oz, 89.086 kg)    Systolic Blood Pressure :   151 mmHg (HI)    Diastolic Blood Pressure :   79 mmHg   Mean Arterial Pressure :   103 mmHg   Peripheral Pulse Rate :   67 bpm   BP Site :   Right arm   BP Method :   Electronic   HR Method :   Electronic   Temperature Tympanic :   98.1 DegF(Converted to: 36.7 DegC)    Teresa Damon - 6/24/2021 8:30 AM CDT   Health Status   Allergies Verified? :   Yes   Medication History Verified? :   Yes   Medical History Verified? :   Yes   Pre-Visit Planning Status :   Completed   Tobacco Use? :   Never smoker   Teresa Damon - 6/24/2021 8:30 AM CDT   Consents   Consent for Immunization Exchange :   Consent Granted   Consent for Immunizations to Providers :   Consent Granted   Teresa Damon - 6/24/2021 8:30 AM CDT   Meds / Allergies   (As Of: 6/24/2021 8:35:23 AM CDT)   Allergies (Active)   Dust  Estimated Onset Date:   Unspecified ; Reactions:   Sneezing.. ; Created By:   Jeanette Manzo; Reaction Status:   Active ; Category:   Environment ; Substance:   Dust ; Type:   Allergy ; Updated By:   Jeanette Manzo; Source:   Other ; Reviewed Date:   6/24/2021 8:33 AM CDT      Mold  Estimated Onset Date:   Unspecified ; Reactions:   Sneezing.. ; Created By:   Jeanette Manzo; Reaction Status:   Active ; Category:   Environment ; Substance:   Mold ; Type:   Allergy ; Updated By:   Jeanette Manzo; Source:   Other ; Reviewed Date:   6/24/2021 8:33 AM CDT      No Known Medication Allergies  Estimated Onset Date:   Unspecified ; Created By:   Felicity Perez MA; Reaction Status:   Active ; Category:   Drug ; Substance:   No Known Medication Allergies ; Type:   Allergy ; Updated By:   Felicity Perez MA; Reviewed Date:   6/24/2021 8:33 AM CDT      Pollen   Estimated Onset Date:   Unspecified ; Reactions:   Sneezing.. ; Created By:   Jeanette Manzo; Reaction Status:   Active ; Category:   Environment ; Substance:   Pollen ; Type:   Allergy ; Updated By:   Jeanette Manzo; Source:   Other ; Reviewed Date:   6/24/2021 8:33 AM CDT        Medication List   (As Of: 6/24/2021 8:35:23 AM CDT)   Prescription/Discharge Order    tamsulosin  :   tamsulosin ; Status:   Prescribed ; Ordered As Mnemonic:   tamsulosin 0.4 mg oral capsule ; Simple Display Line:   0.4 mg, 1 cap(s), Oral, daily, for 90 day(s), 90 cap(s), 3 Refill(s) ; Ordering Provider:   Charles Churchill MD; Catalog Code:   tamsulosin ; Order Dt/Tm:   10/8/2020 9:08:40 AM CDT          tadalafil  :   tadalafil ; Status:   Prescribed ; Ordered As Mnemonic:   Cialis 20 mg oral tablet ; Simple Display Line:   20 mg, 1 tab(s), po, daily, 88 tab(s), 3 Refill(s) ; Ordering Provider:   Charles Churchill MD; Catalog Code:   tadalafil ; Order Dt/Tm:   10/29/2019 4:08:22 PM CDT          simvastatin  :   simvastatin ; Status:   Prescribed ; Ordered As Mnemonic:   simvastatin 40 mg oral tablet ; Simple Display Line:   40 mg, 1 tab(s), PO, Once a day (at bedtime), 90 tab(s), 3 Refill(s) ; Ordering Provider:   Charles Churchill MD; Catalog Code:   simvastatin ; Order Dt/Tm:   12/14/2020 11:19:20 AM CST          fluticasone nasal  :   fluticasone nasal ; Status:   Prescribed ; Ordered As Mnemonic:   Flonase 50 mcg/inh nasal spray ; Simple Display Line:   2 spray(s), nasal, daily, 1 EA, 11 Refill(s) ; Ordering Provider:   Charles Churchill MD; Catalog Code:   fluticasone nasal ; Order Dt/Tm:   12/26/2016 8:54:51 AM CST          amlodipine-benazepril  :   amlodipine-benazepril ; Status:   Prescribed ; Ordered As Mnemonic:   amlodipine-benazepril 5 mg-20 mg oral capsule ; Simple Display Line:   1 cap(s), Oral, daily, 90 cap(s), 3 Refill(s) ; Ordering Provider:   Charles Churchill MD; Catalog Code:   amlodipine-benazepril ; Order Dt/Tm:   12/14/2020  11:20:06 AM CST            Home Meds    gabapentin  :   gabapentin ; Status:   Processing ; Ordered As Mnemonic:   gabapentin 300 mg oral capsule ; Action Display:   Complete ; Catalog Code:   gabapentin ; Order Dt/Tm:   6/24/2021 8:32:59 AM CDT          fexofenadine  :   fexofenadine ; Status:   Documented ; Ordered As Mnemonic:   Allegra ; Simple Display Line:   po ; Catalog Code:   fexofenadine ; Order Dt/Tm:   2/24/2015 1:23:44 PM CST          carbidopa-levodopa  :   carbidopa-levodopa ; Status:   Documented ; Ordered As Mnemonic:   carbidopa-levodopa 50 mg-200 mg oral tablet, extended release ; Simple Display Line:   1 tab(s), po, tid, 0 Refill(s) ; Catalog Code:   carbidopa-levodopa ; Order Dt/Tm:   6/27/2016 2:45:43 PM CDT

## 2022-02-15 NOTE — NURSING NOTE
Comprehensive Intake Entered On:  10/29/2019 3:53 PM CDT    Performed On:  10/29/2019 3:48 PM CDT by Teresa Damon               Summary   Chief Complaint :   Patient is here today for annual px, med refill   Weight Measured :   210.08 lb(Converted to: 210 lb 1 oz, 95.29 kg)    Height Measured :   66 in(Converted to: 5 ft 6 in, 167.64 cm)    Body Mass Index :   33.9 kg/m2 (HI)    Body Surface Area :   2.1 m2   Systolic Blood Pressure :   167 mmHg (HI)    Diastolic Blood Pressure :   90 mmHg (HI)    Mean Arterial Pressure :   116 mmHg   Peripheral Pulse Rate :   74 bpm   BP Site :   Right arm   BP Method :   Manual   HR Method :   Electronic   Temperature Tympanic :   98.6 DegF(Converted to: 37.0 DegC)    Teresa Damon - 10/29/2019 3:48 PM CDT   Health Status   Allergies Verified? :   Yes   Medication History Verified? :   Yes   Medical History Verified? :   Yes   Pre-Visit Planning Status :   Completed   Tobacco Use? :   Never smoker   Teresa Damon - 10/29/2019 3:48 PM CDT   Consents   Consent for Immunization Exchange :   Consent Granted   Consent for Immunizations to Providers :   Consent Granted   Teresa Damon - 10/29/2019 3:48 PM CDT   Meds / Allergies   (As Of: 10/29/2019 3:53:11 PM CDT)   Allergies (Active)   Dust  Estimated Onset Date:   Unspecified ; Reactions:   Sneezing.. ; Created By:   Jeanette Manzo; Reaction Status:   Active ; Category:   Environment ; Substance:   Dust ; Type:   Allergy ; Updated By:   Jeanette Manzo; Source:   Other ; Reviewed Date:   10/29/2019 3:50 PM CDT      Mold  Estimated Onset Date:   Unspecified ; Reactions:   Sneezing.. ; Created By:   Jeanette Manzo; Reaction Status:   Active ; Category:   Environment ; Substance:   Mold ; Type:   Allergy ; Updated By:   Jeanette Manzo; Source:   Other ; Reviewed Date:   10/29/2019 3:50 PM CDT      No Known Medication Allergies  Estimated Onset Date:   Unspecified ; Created By:   Felicity Perez MA; Reaction Status:   Active  ; Category:   Drug ; Substance:   No Known Medication Allergies ; Type:   Allergy ; Updated By:   Felicity Perez MA; Reviewed Date:   10/29/2019 3:50 PM CDT      Pollen  Estimated Onset Date:   Unspecified ; Reactions:   Sneezing.. ; Created By:   Jeanette Manzo; Reaction Status:   Active ; Category:   Environment ; Substance:   Pollen ; Type:   Allergy ; Updated By:   Jeanette Manzo; Source:   Other ; Reviewed Date:   10/29/2019 3:50 PM CDT        Medication List   (As Of: 10/29/2019 3:53:11 PM CDT)   Prescription/Discharge Order    tadalafil  :   tadalafil ; Status:   Suspended ; Ordered As Mnemonic:   Cialis 20 mg oral tablet ; Simple Display Line:   20 mg, 1 tab(s), po, daily, 88 tab(s), 3 Refill(s) ; Ordering Provider:   Charles Churchill MD; Catalog Code:   tadalafil ; Order Dt/Tm:   11/1/2018 10:14:28 AM CDT          simvastatin  :   simvastatin ; Status:   Suspended ; Ordered As Mnemonic:   simvastatin 40 mg oral tablet ; Simple Display Line:   40 mg, 1 tab(s), PO, Once a day (at bedtime), 90 tab(s), 3 Refill(s) ; Ordering Provider:   Charles Churchill MD; Catalog Code:   simvastatin ; Order Dt/Tm:   11/1/2018 10:13:25 AM CDT          fluticasone nasal  :   fluticasone nasal ; Status:   Prescribed ; Ordered As Mnemonic:   Flonase 50 mcg/inh nasal spray ; Simple Display Line:   2 spray(s), nasal, daily, 1 EA, 11 Refill(s) ; Ordering Provider:   Charles Churchill MD; Catalog Code:   fluticasone nasal ; Order Dt/Tm:   12/26/2016 8:54:51 AM CST            Home Meds    cefadroxil  :   cefadroxil ; Status:   Processing ; Ordered As Mnemonic:   cefadroxil 1000 mg oral tablet ; Action Display:   Complete ; Catalog Code:   cefadroxil ; Order Dt/Tm:   10/29/2019 3:50:17 PM CDT          carbidopa-levodopa  :   carbidopa-levodopa ; Status:   Documented ; Ordered As Mnemonic:   carbidopa-levodopa 50 mg-200 mg oral tablet, extended release ; Simple Display Line:   1 tab(s), po, qid, 0 Refill(s) ; Catalog Code:   carbidopa-levodopa  ; Order Dt/Tm:   6/27/2016 2:45:43 PM CDT          fexofenadine  :   fexofenadine ; Status:   Documented ; Ordered As Mnemonic:   Allegra ; Simple Display Line:   po ; Catalog Code:   fexofenadine ; Order Dt/Tm:   2/24/2015 1:23:44 PM CST

## 2022-02-15 NOTE — NURSING NOTE
Removed ABPM @ 1018. Received completed diary. Downloaded, printed, and gave report to JDINESH. Patient was informed of process and to call if they do not hear from provider within a few business days.

## 2022-02-15 NOTE — TELEPHONE ENCOUNTER
---------------------  From: Cathy Batres CMA (Phone Messages Pool ([id number]_[facility]))   To: [facility] Message Pool ([id number]);     Sent: 10/21/2020 2:21:06 PM CDT  Subject: FW: Prescription extention           ---------------------  From: GENNA MONCADA  To: [facility]  Sent: 10/21/2020 01:51 p.m. CDT  Subject: Prescription extention  Would Dr. Terrance Santos extend my current prescription for Tamsulosin 0.4MG for another refill. I currently have an appointment scheduled with Dr. Churchill on December 14th at which time I will ask him to renew the prescription but I will be out prior to that appointment.    Thanks,  Anand Moncada---------------------  From: Yesenia De La Vega CMA ([facility] Message Pool ([id number]_[facility]))   To: Terrance Santos MD; GENNA MONCADA    Sent: 10/21/2020 3:00:35 PM CDT  Subject: RE: Prescription extention     Mik Michel,  Looks like Dr Churchill has already sent a refill of your Tamsulosin to [facility] for you on 10/8.    Yesenia

## 2022-02-15 NOTE — NURSING NOTE
Comprehensive Intake Entered On:  8/25/2020 10:13 AM CDT    Performed On:  8/25/2020 10:07 AM CDT by Yesenia De La Vega CMA               Summary   Chief Complaint :   issues with urination x Sun night - only able to go small amts at a time.  Needing to force at times    Weight Measured :   204.8 lb(Converted to: 204 lb 13 oz, 92.90 kg)    Systolic Blood Pressure :   170 mmHg (HI)    Diastolic Blood Pressure :   90 mmHg (HI)    Mean Arterial Pressure :   117 mmHg   Peripheral Pulse Rate :   80 bpm   Temperature Tympanic :   97.3 DegF(Converted to: 36.3 DegC)  (LOW)    Yesenia De La Vega CMA - 8/25/2020 10:07 AM CDT   Health Status   Allergies Verified? :   Yes   Medication History Verified? :   Yes   Medical History Verified? :   Yes   Pre-Visit Planning Status :   Completed   Tobacco Use? :   Never smoker   Yesenia De La Vega CMA - 8/25/2020 10:07 AM CDT   ID Risk Screen   Recent Travel History :   Last travel within 7 days   Recent Travel Location :   [place]   Family Member Travel History :   Last travel within 7 days   Family Member Recent Travel Location :   [place]   Other Exposure to Infectious Disease :   Unknown   Yesenia De La Vega CMA - 8/25/2020 10:07 AM CDT   Social History   Social History   (As Of: 8/25/2020 10:13:42 AM CDT)   Alcohol:        Current, 1-2 times per month, 3 drinks/episode average.   (Last Updated: 10/30/2019 12:01:12 PM CDT by Carla Pandey CMA)          Tobacco:        Never   (Last Updated: 11/29/2010 2:18:41 PM CST by Charles Churchill MD)          Substance Abuse:        Never   (Last Updated: 12/27/2012 11:32:55 AM CST by Nay Chaney)          Employment/School:        Employed, Work/School description:  - [employer].   (Last Updated: 12/27/2012 12:08:52 PM CST by Nay Chaney)          Home/Environment:        Marital status: .  Spouse/Partner name: Yoana.  Lives with Spouse.   (Last Updated: 12/27/2012 11:44:08 AM CST by Nay Chaney)           Nutrition/Health:        Type of diet: Regular.  Caffeine intake amount: Rare.   (Last Updated: 12/27/2012 12:06:14 PM CST by Nay Chaney)          Exercise:        Exercise frequency: 3-4 times/week.   (Last Updated: 12/27/2012 11:33:11 AM CST by Nay Chaney)          Sexual:        Sexually active: Yes.  Sexual orientation: Heterosexual.   (Last Updated: 12/27/2012 11:33:31 AM CST by Nay Chaney)          Other:        Wears glasses.   (Last Updated: 12/27/2012 12:06:31 PM CST by Nay Chaney)

## 2022-02-15 NOTE — PROGRESS NOTES
Chief Complaint  Patient is here for neuorstimulator in left side of chest that is beginning to feel warm to the touch and is at the site. Was placed in March. Has not had fever that he knows of. Started noticing yesterday.  History of Present Illness  patient present to clinic today for concern regarding possible infection of his brain stimulator battery component.  He has a history of Parkinson disease had brain stimulator placed in March.  The past 2 days he has developed increased redness swelling and warmth over 12 left upper chest.  This is slowly been worsening.  He denies any malaise, fever, chills, or increased fatigue.  He has had no new headache.  Review of systems is negative except as per HPI including:  no fevers, chills, sore throat, runny nose, nausea, vomiting, constipation, diarrhea, rash or new skin lesions,  palpitations, slurred speech, new paresthesia, shortness of breath or wheeze.  Exam:  General: alert and oriented ×3 no acute distress.  HEENT: pupils are equal round and reactive to light extraocular motion is intact. Normocephalic and atraumatic.   Hearing is grossly normal and there is no otorrhea.   Nares are patent there is no rhinorrhea.   Mucous membranes are moist and pink.  Chest: has bilateral rise with no increased work of breathing.  he has a 10 cm x 10 cm subcutaneous palpable device on his left upper chest wall, the overlying skin is red, warm and tender, above this is a 10 cm skin incision scar well healed with tenderness but no drainage or skin breakdown  Cardiovascular: normal perfusion and brisk capillary refill.  Musculoskeletal: no gross focal abnormalities and normal gait.  Neuro: no gross focal abnormalities and memory seems intact.  Psychiatric: speech is clear and coherent and fluent. Patient dressed appropriately for the weather. Mood is appropriate and affect is full.         Discussed with patient to return to clinic if symptoms worsen or do not improve  Physical  Exam     Vitals & Measurements    T: 97.9   F (Tympanic)  HR: 68(Peripheral)  BP: 122/68     WT: 204.0 lb   Assessment/Plan  Cellulitis (L03.90)    Orders:  C-Reactive Protein* ([facility]), Specimen Type: Serum, Collection Date: 08/28/18 20:10:00 CDT  CBC w/ Diff/Plt (Request), Priority: Urgent, Infection of brain stimulator  Cellulitis  Parkinsonian syndrome  Chest wall pain  Sedimentation Rate, Westergren (Request), Priority: Urgent, Infection of brain stimulator  Cellulitis  Parkinsonian syndrome  Chest wall pain     Chest wall pain (R07.89)    Orders:  C-Reactive Protein* ([facility]), Specimen Type: Serum, Collection Date: 08/28/18 20:10:00 CDT  CBC w/ Diff/Plt (Request), Priority: Urgent, Infection of brain stimulator  Cellulitis  Parkinsonian syndrome  Chest wall pain  Sedimentation Rate, Westergren (Request), Priority: Urgent, Infection of brain stimulator  Cellulitis  Parkinsonian syndrome  Chest wall pain     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 where the device was placed discussed the case.  Patient's neurosurgeon is Dr. Mamadou Manuel.  His partner Dr. Barakat was on call.  Offered to obtain labs and give patient a dose of Rocephin however the neurosurgeon said that with patient having a lack of systemic symptoms he would prefer that patient not be started on antibiotics but rather follow-up with the neurosurgery team there at [place] tomorrow or the next day but to come to the emergency room immediately if he does develop systemic symptoms.  They would like to evaluate him and if they decide that he needs to have surgery to remove the device they would want to be able to take cultures during surgery.  Explained that if he gets started on antibiotics at this time that those cultures done during surgery would not be helpful to help to direct treatment.        Patient was available for conversation as the neurosurgeon was put on speaker phone.  He was in  agreement with this plan.  Patient will also let me know tomorrow with S phone number to fax results of his CBC and sed rate to.   We will send C-reactive protein is a send out test as a way to get a starting point also        25 minutes was spent with patient in direct face-to-face contact of which greater than 50% of the time was spent counseling patient and coordinating care.    Orders:  C-Reactive Protein* ([facility]), Specimen Type: Serum, Collection Date: 08/28/18 20:10:00 CDT  CBC w/ Diff/Plt (Request), Priority: Urgent, Infection of brain stimulator  Cellulitis  Parkinsonian syndrome  Chest wall pain  Sedimentation Rate, Westergren (Request), Priority: Urgent, Infection of brain stimulator  Cellulitis  Parkinsonian syndrome  Chest wall pain     Parkinsonian syndrome (G20)    Orders:  C-Reactive Protein* ([facility]), Specimen Type: Serum, Collection Date: 08/28/18 20:10:00 CDT  CBC w/ Diff/Plt (Request), Priority: Urgent, Infection of brain stimulator  Cellulitis  Parkinsonian syndrome  Chest wall pain  Sedimentation Rate, Westergren (Request), Priority: Urgent, Infection of brain stimulator  Cellulitis  Parkinsonian syndrome  Chest wall pain     Patient Information  Name:  GENNA BAILEY  Address:   [address] [address]  Sex: Male  YOB: 1960  Phone: [phone]  Emergency Contact: DAMARIS BAILEY  MRN: [id number]  FIN: [id number]  Location: [facility]  Date of Service: 08/28/2018  Primary Care Physician:   Charles Churchill MD, [phone]  Attending Physician:   Shena Pierre MD, [phone]  Problem List/Past Medical History  Ongoing   Allergic Rhinitis   Anxiety   Benign prostatic hyperplasia (BPH) with urinary urgency   Depression, Recurrent   Deviated Septum   Dyslipidemia    Comments: 10 year estimated CV risk 7.6%. Antihpertnsive med not indicated for 24 hour average /73   ED (erectile dysfunction)   Fatty Liver   Metabolic  Syndrome   Obesity   Parkinsonian syndrome   White coat hypertension  Historical    No qualifying data  Procedure/Surgical History    Colonoscopy (03/06/2013)       Comments:    Right sided diverticulosis otherwise normal. Repeat 10 years.    Rotator Cuff Repair - Right (12/03/2012)           Complete repair of rotator cuff - Left (11/2009)           Vasectomy (12/06/1995)           Skin graft (1987)           elbow surgery (1982)         Medications    Allegra: po.    carbidopa-levodopa 50 mg-200 mg oral tablet, extended release: 1 tab(s), po, qid, 0 Refill(s).    Flonase 50 mcg/inh nasal spray: 2 spray(s), nasal, daily, 1 EA, 11 Refill(s).    Cialis 10 mg oral tablet: 10 mg, 1 tab(s), po, daily, 5 tab(s), 11 Refill(s).    simvastatin 40 mg oral tablet: 40 mg, 1 tab(s), PO, Once a day (at bedtime), 90 tab(s), 3 Refill(s).    Cialis 20 mg oral tablet: 20 mg, 1 tab(s), po, daily, 88 tab(s), 0 Refill(s).        Allergies  Dust (Sneezing..)  Mold (Sneezing..)  No Known Medication Allergies  Pollen (Sneezing..)  Social History   Smoking Status - 08/28/2018    Never smoker  Alcohol   Current, 1-2 times per month, 11/29/2010  Employment and Education   Employed, Work/School description:  - [employer] 12/27/2012  Exercise and Physical Activity   Exercise frequency: 3-4 times/week., 12/27/2012  Home and Environment   Marital status: . Spouse/Partner name: Yoana. Lives with Spouse., 12/27/2012  Nutrition and Health   Type of diet: Regular. Caffeine intake amount: Rare., 12/27/2012  Other   Wears glasses., 12/27/2012  Sexual   Sexually active: Yes. Sexual orientation: Heterosexual., 12/27/2012  Substance Abuse   Never, 12/27/2012  Tobacco   Never, 11/29/2010  Family History  Patient was adopted  Immunizations       Vaccine       Date       Status       tetanus/diphth/pertuss (Tdap) adult/adol      12/22/2015      Given       Td      10/18/2004      Recorded  Lab Results      Lab Results (Last 4 results within 90 days)        WBC: 11.5 High [4.5  - 11] (08/28/18 20:28:00 CDT)      RBC: 5.51 [4.3  - 5.9] (08/28/18 20:28:00 CDT)      Hgb: 16.4 [13.5 g/dL - 17.5 g/dL] (08/28/18 20:28:00 CDT)      Hct: 46.4 [37 % - 53 %] (08/28/18 20:28:00 CDT)      MCV: 84 [80 fL - 100 fL] (08/28/18 20:28:00 CDT)      MCH: 29.8 [26 pg - 34 pg] (08/28/18 20:28:00 CDT)      MCHC: 35.3 [32 g/dL - 36 g/dL] (08/28/18 20:28:00 CDT)      RDW: 13.6 [11.5 % - 15.5 %] (08/28/18 20:28:00 CDT)      Platelet: 217 [140  - 440] (08/28/18 20:28:00 CDT)      MPV: 9.8 [6.5 fL - 11 fL] (08/28/18 20:28:00 CDT)      Lymphocytes: 23.3 [4.5  - 11] (08/28/18 20:28:00 CDT)      Abs Lymphocytes: 2.7 [0.9  - 2.9] (08/28/18 20:28:00 CDT)      Neutrophils: 66.6 [0.9  - 2.9] (08/28/18 20:28:00 CDT)      Abs Neutrophils: 7.7 High [1.7  - 7] (08/28/18 20:28:00 CDT)      Monocytes: 8.2 [4.5  - 11] (08/28/18 20:28:00 CDT)      Abs Monocytes: 1.0 High [0.9  - 2.9] (08/28/18 20:28:00 CDT)      Eosinophils: 1.7 [4.5  - 11] (08/28/18 20:28:00 CDT)      Abs Eosinophils: 0.2 [0.9  - 2.9] (08/28/18 20:28:00 CDT)      Basophils: 0.2 [4.5  - 11] (08/28/18 20:28:00 CDT)      Abs Basophils: 0.0 [0.9  - 2.9] (08/28/18 20:28:00 CDT)      Sed Rate: 4 [11.5 % - 15.5 %] (08/28/18 20:28:00 CDT)

## 2022-02-15 NOTE — NURSING NOTE
Quick Intake Entered On:  10/7/2021 8:55 AM CDT    Performed On:  10/7/2021 8:55 AM CDT by Charles Churchill MD               Summary   Height Measured :   66 in(Converted to: 5 ft 6 in, 167.64 cm)    Systolic Blood Pressure :   134 mmHg (HI)    Diastolic Blood Pressure :   76 mmHg   Mean Arterial Pressure :   95 mmHg   BP Site :   Right arm   BP Method :   Manual   Charles Churchill MD - 10/7/2021 8:55 AM CDT

## 2022-02-15 NOTE — TELEPHONE ENCOUNTER
---------------------  From: Rylan/Teresa CRABTREE ([employer] Message Pool ([id number]))   To: Referral Coordinators Pool ([facility]);     Sent: 10/7/2021 9:05:08 AM CDT !  Subject: General Message     Urgent echo order in chart. Please advise---------------------  From: Leonor Chang (Referral Coordinators Pool ([address]))   To: [employer] Message Pool ([address]_[place]);     Sent: 10/7/2021 9:33:59 AM CDT  Subject: RE: General Message     Order is faxed to [facility].  They will have the soonest available appt.Noted.

## 2022-02-15 NOTE — NURSING NOTE
Comprehensive Intake Entered On:  10/7/2021 8:40 AM CDT    Performed On:  10/7/2021 8:34 AM CDT by Teresa Damon               Summary   Chief Complaint :   Preop. Urolift. [facility]. Dr. Bosch. DOS 10/15/21.   Weight Measured :   200.1 lb(Converted to: 200 lb 2 oz, 90.764 kg)    Height Measured :   66 in(Converted to: 5 ft 6 in, 167.64 cm)    Body Mass Index :   32.29 kg/m2 (HI)    Body Surface Area :   2.05 m2   Systolic Blood Pressure :   144 mmHg (HI)    Diastolic Blood Pressure :   72 mmHg   Mean Arterial Pressure :   96 mmHg   Peripheral Pulse Rate :   72 bpm   BP Site :   Right arm   BP Method :   Manual   HR Method :   Electronic   Temperature Tympanic :   97.4 DegF(Converted to: 36.3 DegC)  (LOW)    Oxygen Saturation :   97 %   Teresa Damon - 10/7/2021 8:34 AM CDT   Health Status   Allergies Verified? :   Yes   Medication History Verified? :   Yes   Medical History Verified? :   Yes   Pre-Visit Planning Status :   Completed   Tobacco Use? :   Never smoker   Teresa Damon - 10/7/2021 8:34 AM CDT   Consents   Consent for Immunization Exchange :   Consent Granted   Consent for Immunizations to Providers :   Consent Granted   Teresa Damon - 10/7/2021 8:34 AM CDT   Meds / Allergies   (As Of: 10/7/2021 8:40:45 AM CDT)   Allergies (Active)   Dust  Estimated Onset Date:   Unspecified ; Reactions:   Sneezing.. ; Created By:   Jeanette Manzo; Reaction Status:   Active ; Category:   Environment ; Substance:   Dust ; Type:   Allergy ; Updated By:   Jeanette Manzo; Source:   Other ; Reviewed Date:   10/7/2021 8:38 AM CDT      erythromycin  Estimated Onset Date:   Unspecified ; Reactions:   Rash ; Created By:   Teresa Damon; Reaction Status:   Active ; Category:   Drug ; Substance:   erythromycin ; Type:   Allergy ; Severity:   Mild ; Updated By:   Teresa Damon; Source:   Self ; Reviewed Date:   10/7/2021 8:39 AM CDT      Mold  Estimated Onset Date:   Unspecified ; Reactions:    Sneezing.. ; Created By:   Jeanette Manzo; Reaction Status:   Active ; Category:   Environment ; Substance:   Mold ; Type:   Allergy ; Updated By:   Jeanette Manzo; Source:   Other ; Reviewed Date:   10/7/2021 8:38 AM CDT      Pollen  Estimated Onset Date:   Unspecified ; Reactions:   Sneezing.. ; Created By:   Jeanette Manzo; Reaction Status:   Active ; Category:   Environment ; Substance:   Pollen ; Type:   Allergy ; Updated By:   Jeanette Manzo; Source:   Other ; Reviewed Date:   10/7/2021 8:38 AM CDT        Medication List   (As Of: 10/7/2021 8:40:45 AM CDT)   Prescription/Discharge Order    finasteride  :   finasteride ; Status:   Completed ; Ordered As Mnemonic:   finasteride 5 mg oral tablet ; Simple Display Line:   5 mg, 1 tab(s), Oral, daily, for 30 day(s), 30 tab(s), 3 Refill(s) ; Ordering Provider:   Saul Roman MD; Catalog Code:   finasteride ; Order Dt/Tm:   8/26/2021 6:04:37 PM CDT          fluticasone nasal  :   fluticasone nasal ; Status:   Prescribed ; Ordered As Mnemonic:   Flonase 50 mcg/inh nasal spray ; Simple Display Line:   2 spray(s), nasal, daily, 1 EA, 11 Refill(s) ; Ordering Provider:   Charles Churchill MD; Catalog Code:   fluticasone nasal ; Order Dt/Tm:   12/26/2016 8:54:51 AM CST          mometasone nasal  :   mometasone nasal ; Status:   Completed ; Ordered As Mnemonic:   Nasonex 50 mcg/inh nasal spray ; Simple Display Line:   1 spray(s), Nasal, daily, in each nostril, 1 EA, 9 Refill(s) ; Ordering Provider:   Rea Pierre; Catalog Code:   mometasone nasal ; Order Dt/Tm:   9/10/2021 2:29:24 PM CDT          sildenafil  :   sildenafil ; Status:   Prescribed ; Ordered As Mnemonic:   Viagra 50 mg oral tablet ; Simple Display Line:   50 mg, 1 tab(s), Oral, daily, 1 hour before sexual activity, 60 tab(s), 5 Refill(s) ; Ordering Provider:   Charles Churchill MD; Catalog Code:   sildenafil ; Order Dt/Tm:   6/24/2021 8:53:55 AM CDT          simvastatin  :   simvastatin ; Status:    Prescribed ; Ordered As Mnemonic:   simvastatin 40 mg oral tablet ; Simple Display Line:   40 mg, 1 tab(s), PO, Once a day (at bedtime), 90 tab(s), 3 Refill(s) ; Ordering Provider:   Charles Churchill MD; Catalog Code:   simvastatin ; Order Dt/Tm:   12/14/2020 11:19:20 AM CST            Home Meds    carbidopa-levodopa  :   carbidopa-levodopa ; Status:   Documented ; Ordered As Mnemonic:   carbidopa-levodopa 50 mg-200 mg oral tablet, extended release ; Simple Display Line:   1 tab(s), po, tid, 0 Refill(s) ; Catalog Code:   carbidopa-levodopa ; Order Dt/Tm:   6/27/2016 2:45:43 PM CDT          fexofenadine  :   fexofenadine ; Status:   Documented ; Ordered As Mnemonic:   Allegra ; Simple Display Line:   po ; Catalog Code:   fexofenadine ; Order Dt/Tm:   2/24/2015 1:23:44 PM CST          tamsulosin  :   tamsulosin ; Status:   Documented ; Ordered As Mnemonic:   tamsulosin 0.4 mg oral capsule ; Simple Display Line:   0.4 mg, 1 cap(s), Oral, daily, 30 cap(s), 0 Refill(s) ; Catalog Code:   tamsulosin ; Order Dt/Tm:   10/7/2021 8:38:11 AM CDT

## 2022-02-15 NOTE — LETTER
(Inserted Image. Unable to display) August 26, 2020Re: GENNA JOELLEULISESSHREYADOB:  1960 [facility]  [address] [place], [place] [address]Be:  [facility] The following patient has been referred to your office/practice:   GENNA BAILEY Appointment : N/ALocation : WILLI Caroleailin refer to the attached clinical documentation for a summary of GENNA's care.  Please do not hesitate to contact our office if any additional clinical questions arise. All relevant records and transition of care documents should be mailed or faxed.Your assistance in providing continuity of care is appreciatedSincerely, [facility] & [address] [place] [address](P) [phone](F) [phone]

## 2022-02-15 NOTE — TELEPHONE ENCOUNTER
Entered by Carey Caro on August 25, 2020 2:42:44 PM CDT  90 day supply sent by DIANNA at appointment.           ------------------------------------------  From: [employer] #[id number]  To: Terrance Santos MD  Sent: August 25, 2020 10:41:28 AM CDT  Subject: Medication Management  Due: August 12, 2020 4:17:26 PM CDT     ** On Hold Pending Signature **     Dispensed Drug: tamsulosin (tamsulosin 0.4 mg oral capsule), TAKE 1 CAPSULE BY MOUTH DAILY  Quantity: 90 cap(s)  Days Supply: 90  Refills: 0  Substitutions Allowed  Notes from Pharmacy: **Patient requests 90 days supply**  ------------------------------------------

## 2022-02-15 NOTE — TELEPHONE ENCOUNTER
---------------------  From: Charles Churchill MD   To: Appointment Pool ([id number]_[place]);     Sent: 9/20/2021 4:48:45 PM CDT  Subject: General Message   Actions: Notify the patient for appointment      Please schedule ED F/U visit.Left message for patient to call back and scheduleLMX2 ON VM

## 2022-02-15 NOTE — TELEPHONE ENCOUNTER
---------------------  From: Rylan/Teresa CRABTREE ([employer] Message Pool ([id number]_[place]))   To: Charles Churchill MD;     Sent: 12/14/2021 10:28:42 AM CST  Subject: FW: FW: CONSUMER MESSAGEFW: MRI Request           ---------------------  From: GENNA BAILEY  To: [employer] Message Pool ([id number]_[facility])  Sent: 12/13/2021 05:23 p.m. CST  Subject: RE: FW: CONSUMER MESSAGEFW: MRI Request  Thank you!    Can they send it to [facility] in ?       Addendum by Belkis Mcnamara on December 13, 2021 5:01:57 PM CST  ---------------------  From: Belkis Mcnamara ([employer] Message Pool ([id number]_[facility]))  To: GENNA BAILEY  Sent: 12/13/2021 5:01:57 PM CST  Subject: FW: CONSUMER MESSAGEFW: MRI Request       Anand, where would you like the medication to be sent?       We will fix the MRI order to [facility] in [place] for you.       Addendum by Charles Churchill MD on December 13, 2021 1:12:43 PM CST  ---------------------  From: Charles Churchill MD  To: [employer] Message Pool ([id number]_[facility]);  Cc: Referral Coordinators Pool ([facility]24[facility]);  Sent: 12/13/2021 1:12:43 PM CST  Subject: RE: CONSUMER MESSAGEFW: MRI Request       MRI order in [facility]. Where would he like me to send Lorazepakm prescription?       Addendum by Belkis Mcnamara on December 13, 2021 10:35:18 AM CST  ---------------------  From: Belkis Mcnamara ([employer] Message Pool ([id number]_[place]))  To: Charles Churchill MD;  Sent: 12/13/2021 10:35:18 AM CST  Subject: FW: CONSUMER MESSAGEFW: MRI Request  ---------------------  From: Marah Smith LPN (Phone Messages Pool ([id number]_[facility]))  To: [employer] Message Pool ([address]_[facility]); GENNA BAILEY  Sent: 12/10/2021 2:53:50 PM CST  Subject: CONSUMER MESSAGEFW: MRI Request       Hi Anand,       Dr. Churchill is out of clinic today. I will forward this message for him to receive Monday when he is back.       Thanks,  Marah GALLOWAY LPN                 ---------------------  From: GENNA BAILEY  To:  [facility]  Sent: 12/10/2021 02:47 p.m. CST  Subject: MRI Request  This message is for Dr. Churchill,  I have been having back trouble for about 8 weeks now and saw a doctor at the [facility] here in [place] where I am wintering. He ordered a lumbar MRI for me, however I can t get in until February 14th of 2022.  I was wondering if you could write an order for a lumbar spine MRI at [facility] in [place] for me? I am planning on being back from Dec 17th through the 30th. I have contacted them to verify openings and they do have some but need an order.  I will need a sedative because I am claustrophobic. They also need to be aware of my Medtronic Deep Brain Stimulater and my recent Urolift procedure of the prostate, there are some MRI restrictions with this procedure.  I understand if you can t write the order without having seen me for this issue, no worries there. I just hoped the was a chance of getting this done while I am home for [place].  Thank you,  Anand Moncada---------------------  From: Zhao MENESES Siloam   To: [employer] Pool ([address]_[place]);     Sent: 12/14/2021 1:17:53 PM CST  Subject: RE: FW: CONSUMER MESSAGEFW: MRI Request     done---------------------  From: Rylan/Teresa CRABTREE ([employer] Pool (32224_[employer]))   To: RANCHO MONCADA    Sent: 12/14/2021 1:47:16 PM CST  Subject: FW: FW: CONSUMER MESSAGEFW: MRI Request     Rancho,  '  Your prescription has been filled.    Teresa ALCANTAR CMA

## 2022-02-15 NOTE — TELEPHONE ENCOUNTER
"---------------------  From: Karli Silva RN   Sent: 9/7/2021 12:37:36 PM CDT  Subject: Verify allergies     Pt called at 1325 asking what allergies were listed in his chart. He thought he had a med allergy \"many years ago\" but we only have environmental allergies listed.    He is going to check his [facility] record and see if they have something listed for med allergy.  "

## 2022-03-02 NOTE — TELEPHONE ENCOUNTER
---------------------  From: Toan HUNTER, Sola (Phone Messages Pool ([id number]_[facility]))   To: RANCHO MONCADA    Sent: 1/27/2022 8:03:31 AM CST  Subject: FW: Prescription Refill     Good morning, Rancho,    A refill has been sent to [facility]. When you return from [place], you will are due for fasting labs and an annual wellness visit.     Thank you,  Sola CARMONA RN      ---------------------  From: RANCHO MONCADA  To: [facility]  Sent: 01/26/2022 08:34 p.m. CST  Subject: Prescription Refill  This message is for doctor Zhao,    I am wintering in [place] and need my prescription for Simvastatin 40mg refilled. Can you please call in a 90 day supply to the [facility] [place] [address]. Phone [phone].They said they have tried to get it renewed but haven t been able to get approval.    Thank you!  Anand Moncada

## 2022-04-03 ENCOUNTER — HEALTH MAINTENANCE LETTER (OUTPATIENT)
Age: 62
End: 2022-04-03

## 2022-04-07 ENCOUNTER — OFFICE VISIT (OUTPATIENT)
Dept: FAMILY MEDICINE | Facility: CLINIC | Age: 62
End: 2022-04-07
Payer: COMMERCIAL

## 2022-04-07 VITALS
SYSTOLIC BLOOD PRESSURE: 140 MMHG | BODY MASS INDEX: 34.22 KG/M2 | WEIGHT: 212 LBS | HEART RATE: 88 BPM | DIASTOLIC BLOOD PRESSURE: 84 MMHG | OXYGEN SATURATION: 97 %

## 2022-04-07 DIAGNOSIS — M54.50 CHRONIC MIDLINE LOW BACK PAIN WITHOUT SCIATICA: ICD-10-CM

## 2022-04-07 DIAGNOSIS — G20.A1 PARKINSON'S DISEASE (H): ICD-10-CM

## 2022-04-07 DIAGNOSIS — R09.89 LABILE HYPERTENSION DUE TO CLINICAL ENVIRONMENT: ICD-10-CM

## 2022-04-07 DIAGNOSIS — E78.5 DYSLIPIDEMIA: ICD-10-CM

## 2022-04-07 DIAGNOSIS — G89.29 CHRONIC MIDLINE LOW BACK PAIN WITHOUT SCIATICA: ICD-10-CM

## 2022-04-07 DIAGNOSIS — R10.84 GENERALIZED ABDOMINAL DISCOMFORT: Primary | ICD-10-CM

## 2022-04-07 PROBLEM — J45.909 ASTHMA: Status: ACTIVE | Noted: 2021-12-01

## 2022-04-07 PROBLEM — K76.0 STEATOSIS OF LIVER: Status: ACTIVE | Noted: 2022-04-07

## 2022-04-07 PROBLEM — M54.9 PAIN IN BACK: Status: ACTIVE | Noted: 2020-12-03

## 2022-04-07 PROBLEM — Z96.89 PRESENCE OF OTHER SPECIFIED FUNCTIONAL IMPLANTS: Status: ACTIVE | Noted: 2019-10-03

## 2022-04-07 PROBLEM — N40.0 BENIGN PROSTATIC HYPERPLASIA: Status: ACTIVE | Noted: 2022-04-07

## 2022-04-07 PROBLEM — M47.817 SPONDYLOSIS OF LUMBOSACRAL SPINE WITHOUT MYELOPATHY: Status: ACTIVE | Noted: 2022-02-22

## 2022-04-07 PROBLEM — E88.810 METABOLIC SYNDROME: Status: ACTIVE | Noted: 2022-04-07

## 2022-04-07 PROBLEM — J30.9 ALLERGIC RHINITIS: Status: ACTIVE | Noted: 2022-04-07

## 2022-04-07 PROBLEM — F41.9 ANXIETY: Status: ACTIVE | Noted: 2022-04-07

## 2022-04-07 PROBLEM — N52.9 ERECTILE DYSFUNCTION: Status: ACTIVE | Noted: 2022-04-07

## 2022-04-07 PROBLEM — F33.9 RECURRENT DEPRESSIVE DISORDER (H): Status: ACTIVE | Noted: 2022-04-07

## 2022-04-07 PROBLEM — K21.9 GASTROESOPHAGEAL REFLUX DISEASE: Status: ACTIVE | Noted: 2022-04-07

## 2022-04-07 PROBLEM — R33.9 RETENTION OF URINE: Status: ACTIVE | Noted: 2022-04-07

## 2022-04-07 LAB
ALBUMIN UR-MCNC: ABNORMAL MG/DL
APPEARANCE UR: CLEAR
BACTERIA #/AREA URNS HPF: ABNORMAL /HPF
BILIRUB UR QL STRIP: NEGATIVE
COLOR UR AUTO: YELLOW
ERYTHROCYTE [DISTWIDTH] IN BLOOD BY AUTOMATED COUNT: 13 % (ref 10–15)
GLUCOSE UR STRIP-MCNC: NEGATIVE MG/DL
HCT VFR BLD AUTO: 46.9 % (ref 40–53)
HGB BLD-MCNC: 15.6 G/DL (ref 13.3–17.7)
HGB UR QL STRIP: ABNORMAL
KETONES UR STRIP-MCNC: NEGATIVE MG/DL
LEUKOCYTE ESTERASE UR QL STRIP: NEGATIVE
MCH RBC QN AUTO: 29.3 PG (ref 26.5–33)
MCHC RBC AUTO-ENTMCNC: 33.3 G/DL (ref 31.5–36.5)
MCV RBC AUTO: 88 FL (ref 78–100)
MUCOUS THREADS #/AREA URNS LPF: PRESENT /LPF
NITRATE UR QL: NEGATIVE
PH UR STRIP: 5.5 [PH] (ref 5–7)
PLATELET # BLD AUTO: 226 10E3/UL (ref 150–450)
RBC # BLD AUTO: 5.33 10E6/UL (ref 4.4–5.9)
RBC #/AREA URNS AUTO: ABNORMAL /HPF
SP GR UR STRIP: 1.02 (ref 1–1.03)
UROBILINOGEN UR STRIP-ACNC: 0.2 E.U./DL
WBC # BLD AUTO: 7.5 10E3/UL (ref 4–11)
WBC #/AREA URNS AUTO: ABNORMAL /HPF

## 2022-04-07 PROCEDURE — 85027 COMPLETE CBC AUTOMATED: CPT | Performed by: INTERNAL MEDICINE

## 2022-04-07 PROCEDURE — 36415 COLL VENOUS BLD VENIPUNCTURE: CPT | Performed by: INTERNAL MEDICINE

## 2022-04-07 PROCEDURE — 80053 COMPREHEN METABOLIC PANEL: CPT | Performed by: INTERNAL MEDICINE

## 2022-04-07 PROCEDURE — 99214 OFFICE O/P EST MOD 30 MIN: CPT | Performed by: INTERNAL MEDICINE

## 2022-04-07 PROCEDURE — 81001 URINALYSIS AUTO W/SCOPE: CPT | Performed by: INTERNAL MEDICINE

## 2022-04-07 RX ORDER — SILDENAFIL 50 MG/1
TABLET, FILM COATED ORAL
COMMUNITY
Start: 2021-06-24 | End: 2023-04-09

## 2022-04-07 RX ORDER — TADALAFIL 20 MG/1
TABLET ORAL EVERY 24 HOURS
COMMUNITY
End: 2022-05-20 | Stop reason: SINTOL

## 2022-04-07 RX ORDER — SIMVASTATIN 40 MG
TABLET ORAL
COMMUNITY
Start: 2020-12-14 | End: 2022-05-10

## 2022-04-07 RX ORDER — CARBIDOPA AND LEVODOPA 25; 100 MG/1; MG/1
TABLET ORAL
COMMUNITY
Start: 2020-12-03 | End: 2023-10-26

## 2022-04-07 RX ORDER — ALBUTEROL SULFATE 90 UG/1
AEROSOL, METERED RESPIRATORY (INHALATION)
COMMUNITY
Start: 2021-09-09 | End: 2024-02-08

## 2022-04-07 RX ORDER — GLYCOPYRRONIUM 2.4 G/100G
CLOTH TOPICAL
COMMUNITY
Start: 2020-06-30

## 2022-04-07 RX ORDER — MOMETASONE FUROATE MONOHYDRATE 50 UG/1
SPRAY, METERED NASAL
COMMUNITY
Start: 2021-09-10 | End: 2023-10-26

## 2022-04-07 ASSESSMENT — ENCOUNTER SYMPTOMS
FATIGUE: 0
DIARRHEA: 0
VOMITING: 0
FREQUENCY: 0
HEADACHES: 0
UNEXPECTED WEIGHT CHANGE: 0
DIFFICULTY URINATING: 0
HEARTBURN: 1
CHILLS: 0
ABDOMINAL PAIN: 1
ARTHRALGIAS: 0
ABDOMINAL DISTENTION: 0
POLYPHAGIA: 0
CHEST TIGHTNESS: 0
COUGH: 0
BACK PAIN: 1
POLYDIPSIA: 0
BRUISES/BLEEDS EASILY: 0
FEVER: 0
NAUSEA: 0
CONSTIPATION: 0

## 2022-04-07 NOTE — PROGRESS NOTES
Assessment & Plan     Generalized abdominal discomfort  Patient has had acute onset 2 days ago of generalized abdominal discomfort with a benign exam and no other associated symptoms.  Doubt acute abdomen.  Nothing to suggest obstruction.  No tenderness on exam.  The history suggests possible musculoskeletal cause.  - Comprehensive metabolic panel (BMP + Alb, Alk Phos, ALT, AST, Total. Bili, TP); Future  - CBC with platelets; Future  - UA reflex to Microscopic - lab collect; Future  - CT Abdomen Pelvis w Contrast; Future    Chronic midline low back pain without sciatica  Patient had considerable problems with his back while he was wintering in [place] this year and had an injection about a month ago with improvement.  He has no sciatica.  Back is much improved.    Parkinson's disease (H)  Patient has a device.    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                       No follow-ups on file.    Charles Churchill MD  [facility]    Kwame Vickers is a 61 year old who presents for the following health issues     HPI   Patient presents with 2 days of generalized abdominal discomfort.  It is worse with movement such as sitting up or getting up from a chair.  No nausea, vomiting, rectal bleeding, diarrhea, constipation.  History of chronic urinary complaints are improved.  No dysuria.  Nocturia once or twice per night.  No fever or chills.  No weight loss.  No change in bowel habits.  His back pain is much better after an injection about a month ago.  He had considerable problems with his back while wintering in [place] with back pain and left leg sciatica which resolved.  He feels like he gained some weight and is out of shape because he was not able to walk as he normally would due to this problem.        Review of Systems   Constitutional:  Negative for chills, fatigue, fever and unexpected weight change.   Eyes: Negative for visual disturbance.   Respiratory: Negative for cough and chest tightness.    Cardiovascular: Negative for chest pain and peripheral edema.   Gastrointestinal: Positive for abdominal pain and heartburn. Negative for abdominal distention, constipation, diarrhea, nausea and vomiting.   Endocrine: Negative for polydipsia and polyphagia.   Genitourinary: Negative for difficulty urinating, frequency and urgency.   Musculoskeletal: Positive for back pain. Negative for arthralgias.   Skin: Negative for rash.   Neurological: Negative for headaches.   Hematological: Does not bruise/bleed easily.            Objective    BP (!) 140/84 (BP Location: Right arm, Patient Position: Sitting)   Pulse 88   Wt 96.2 kg (212 lb)   SpO2 97%   BMI 34.22 kg/m    Body mass index is 34.22 kg/m .  Physical Exam   Patient appears comfortable and in no distress.  Eyes normal.  HEENT exam unremarkable.  Neck supple no Notley or thyromegaly.  Carotid pulsations normal.  Chest clear.  Back nontender.  No CVA tenderness.  Cardiac exam regular no murmur.  No edema.  Posterior tibial pulses normal.  Abdomen is nontender and nondistended.  Bowel sounds normal.  Perhaps a little bit of tenderness of the lower ribs anteriorly.  No hot joints or rashes are visible.  On neurologic exam he has a resting tremor in the left hand consistent with Parkinson's.  Alert and oriented.  Speech fluent.  Memory is good.

## 2022-04-08 LAB
ALBUMIN SERPL-MCNC: 4.4 G/DL (ref 3.4–5)
ALP SERPL-CCNC: 79 U/L (ref 40–150)
ALT SERPL W P-5'-P-CCNC: 53 U/L (ref 0–70)
ANION GAP SERPL CALCULATED.3IONS-SCNC: 6 MMOL/L (ref 3–14)
AST SERPL W P-5'-P-CCNC: 50 U/L (ref 0–45)
BILIRUB SERPL-MCNC: 0.5 MG/DL (ref 0.2–1.3)
BUN SERPL-MCNC: 22 MG/DL (ref 7–30)
CALCIUM SERPL-MCNC: 8.7 MG/DL (ref 8.5–10.1)
CHLORIDE BLD-SCNC: 107 MMOL/L (ref 94–109)
CO2 SERPL-SCNC: 26 MMOL/L (ref 20–32)
CREAT SERPL-MCNC: 1.02 MG/DL (ref 0.66–1.25)
GFR SERPL CREATININE-BSD FRML MDRD: 85 ML/MIN/1.73M2
GLUCOSE BLD-MCNC: 135 MG/DL (ref 70–99)
POTASSIUM BLD-SCNC: 4.1 MMOL/L (ref 3.4–5.3)
PROT SERPL-MCNC: 8 G/DL (ref 6.8–8.8)
SODIUM SERPL-SCNC: 139 MMOL/L (ref 133–144)

## 2022-05-10 DIAGNOSIS — E78.5 DYSLIPIDEMIA: Primary | ICD-10-CM

## 2022-05-11 DIAGNOSIS — E78.5 DYSLIPIDEMIA: ICD-10-CM

## 2022-05-11 RX ORDER — SIMVASTATIN 40 MG
TABLET ORAL
Qty: 30 TABLET | Refills: 0 | Status: SHIPPED | OUTPATIENT
Start: 2022-05-11 | End: 2022-05-13

## 2022-05-11 NOTE — TELEPHONE ENCOUNTER
Last Written Prescription Date:  1/27/22  Last Fill Quantity: 90,  # refills: 0   Last office visit: 4/7/2022 with prescribing provider:  Generalized abdominal discomfort   Future Office Visit:  Due for fasting lipdis and annual exam    Prescription approved per [facility] Refill Protocol. 30 days.    Karli Silva RN

## 2022-05-13 RX ORDER — SIMVASTATIN 40 MG
TABLET ORAL
Qty: 90 TABLET | Refills: 0 | Status: SHIPPED | OUTPATIENT
Start: 2022-05-13 | End: 2022-05-20

## 2022-05-16 ENCOUNTER — LAB (OUTPATIENT)
Dept: LAB | Facility: CLINIC | Age: 62
End: 2022-05-16
Payer: COMMERCIAL

## 2022-05-16 DIAGNOSIS — E78.5 DYSLIPIDEMIA: ICD-10-CM

## 2022-05-16 LAB
CHOLEST SERPL-MCNC: 143 MG/DL
FASTING STATUS PATIENT QL REPORTED: YES
HDLC SERPL-MCNC: 24 MG/DL
LDLC SERPL CALC-MCNC: 76 MG/DL
NONHDLC SERPL-MCNC: 119 MG/DL
TRIGL SERPL-MCNC: 217 MG/DL

## 2022-05-16 PROCEDURE — 80061 LIPID PANEL: CPT | Performed by: INTERNAL MEDICINE

## 2022-05-16 PROCEDURE — 36415 COLL VENOUS BLD VENIPUNCTURE: CPT | Performed by: INTERNAL MEDICINE

## 2022-05-20 ENCOUNTER — OFFICE VISIT (OUTPATIENT)
Dept: FAMILY MEDICINE | Facility: CLINIC | Age: 62
End: 2022-05-20
Payer: COMMERCIAL

## 2022-05-20 VITALS
HEART RATE: 64 BPM | BODY MASS INDEX: 32.16 KG/M2 | TEMPERATURE: 97.7 F | DIASTOLIC BLOOD PRESSURE: 74 MMHG | HEIGHT: 67 IN | WEIGHT: 204.9 LBS | SYSTOLIC BLOOD PRESSURE: 128 MMHG

## 2022-05-20 DIAGNOSIS — Z96.89 PRESENCE OF OTHER SPECIFIED FUNCTIONAL IMPLANTS: ICD-10-CM

## 2022-05-20 DIAGNOSIS — R12 HEARTBURN: ICD-10-CM

## 2022-05-20 DIAGNOSIS — K76.0 FATTY LIVER: ICD-10-CM

## 2022-05-20 DIAGNOSIS — Z00.00 WELLNESS EXAMINATION: Primary | ICD-10-CM

## 2022-05-20 DIAGNOSIS — E88.810 METABOLIC SYNDROME: ICD-10-CM

## 2022-05-20 DIAGNOSIS — Z12.5 SCREENING FOR PROSTATE CANCER: ICD-10-CM

## 2022-05-20 DIAGNOSIS — E78.5 DYSLIPIDEMIA: ICD-10-CM

## 2022-05-20 DIAGNOSIS — J45.20 MILD INTERMITTENT ASTHMA WITHOUT COMPLICATION: ICD-10-CM

## 2022-05-20 DIAGNOSIS — Z00.00 ROUTINE GENERAL MEDICAL EXAMINATION AT A HEALTH CARE FACILITY: ICD-10-CM

## 2022-05-20 DIAGNOSIS — G20.A1 PARKINSON'S DISEASE (H): ICD-10-CM

## 2022-05-20 DIAGNOSIS — J30.2 SEASONAL ALLERGIC RHINITIS, UNSPECIFIED TRIGGER: ICD-10-CM

## 2022-05-20 DIAGNOSIS — Z13.1 SCREENING FOR DIABETES MELLITUS: ICD-10-CM

## 2022-05-20 DIAGNOSIS — I35.0 NONRHEUMATIC AORTIC VALVE STENOSIS: ICD-10-CM

## 2022-05-20 DIAGNOSIS — M47.817 SPONDYLOSIS OF LUMBOSACRAL SPINE WITHOUT MYELOPATHY: ICD-10-CM

## 2022-05-20 DIAGNOSIS — R09.89 LABILE HYPERTENSION DUE TO CLINICAL ENVIRONMENT: ICD-10-CM

## 2022-05-20 LAB
HBA1C MFR BLD: 5.8 % (ref 0–5.6)
PSA SERPL-MCNC: 0.91 UG/L (ref 0–4)

## 2022-05-20 PROCEDURE — G0103 PSA SCREENING: HCPCS | Performed by: INTERNAL MEDICINE

## 2022-05-20 PROCEDURE — 99396 PREV VISIT EST AGE 40-64: CPT | Performed by: INTERNAL MEDICINE

## 2022-05-20 PROCEDURE — 83036 HEMOGLOBIN GLYCOSYLATED A1C: CPT | Performed by: INTERNAL MEDICINE

## 2022-05-20 PROCEDURE — 36415 COLL VENOUS BLD VENIPUNCTURE: CPT | Performed by: INTERNAL MEDICINE

## 2022-05-20 RX ORDER — SIMVASTATIN 40 MG
TABLET ORAL
Qty: 90 TABLET | Refills: 3 | Status: SHIPPED | OUTPATIENT
Start: 2022-05-20 | End: 2023-04-10

## 2022-05-20 ASSESSMENT — ENCOUNTER SYMPTOMS
COUGH: 0
FATIGUE: 1
FREQUENCY: 1
HEARTBURN: 1
ABDOMINAL DISTENTION: 0
UNEXPECTED WEIGHT CHANGE: 0
NAUSEA: 0
SLEEP DISTURBANCE: 0
FEVER: 0
CONSTIPATION: 1
HEADACHES: 0
TREMORS: 1
WEAKNESS: 0
VOMITING: 0
NERVOUS/ANXIOUS: 0
DIARRHEA: 0
BRUISES/BLEEDS EASILY: 0
ABDOMINAL PAIN: 0
RHINORRHEA: 0
CHILLS: 0
DIFFICULTY URINATING: 0
PALPITATIONS: 0
APPETITE CHANGE: 0
FLANK PAIN: 0
SHORTNESS OF BREATH: 0
MYALGIAS: 0

## 2022-05-20 ASSESSMENT — PATIENT HEALTH QUESTIONNAIRE - PHQ9
10. IF YOU CHECKED OFF ANY PROBLEMS, HOW DIFFICULT HAVE THESE PROBLEMS MADE IT FOR YOU TO DO YOUR WORK, TAKE CARE OF THINGS AT HOME, OR GET ALONG WITH OTHER PEOPLE: SOMEWHAT DIFFICULT
SUM OF ALL RESPONSES TO PHQ QUESTIONS 1-9: 5
SUM OF ALL RESPONSES TO PHQ QUESTIONS 1-9: 5

## 2022-05-20 NOTE — PROGRESS NOTES
SUBJECTIVE:   CC: Rancho Moncada is an 61 year old male who presents for preventative health visit.       Patient has been advised of split billing requirements and indicates understanding: Yes  Healthy Habits:     Getting at least 3 servings of Calcium per day:  NO    Bi-annual eye exam:  Yes    Dental care twice a year:  NO    Sleep apnea or symptoms of sleep apnea:  None    Diet:  Regular (no restrictions)    Frequency of exercise:  4-5 days/week    Duration of exercise:  45-60 minutes    Taking medications regularly:  Yes    Medication side effects:  Other    PHQ-2 Total Score: 1    Additional concerns today:  Yes      Today's PHQ-2 Score:   PHQ-2 ( 1999 Pf[employer]) 5/20/2022   Q1: Little interest or pleasure in doing things 1   Q2: Feeling down, depressed or hopeless 0   PHQ-2 Score 1   Q1: Little interest or pleasure in doing things Several days   Q2: Feeling down, depressed or hopeless Not at all   PHQ-2 Score 1       Abuse: Current or Past(Physical, Sexual or Emotional)- No  Do you feel safe in your environment? Yes    Have you ever done Advance Care Planning? (For example, a Health Directive, POLST, or a discussion with a medical provider or your loved ones about your wishes): Yes, patient states has an Advance Care Planning document and will bring a copy to the clinic.    Social History     Tobacco Use     Smoking status: Never Smoker     Smokeless tobacco: Not on file   Substance Use Topics     Alcohol use: Not on file         Alcohol Use 5/20/2022   Prescreen: >3 drinks/day or >7 drinks/week? No       Last PSA: No results found for: PSA    Reviewed orders with patient. Reviewed health maintenance and updated orders accordingly - Yes      Reviewed and updated as needed this visit by clinical staff    Allergies  Meds                Reviewed and updated as needed this visit by Provider                   Rancho is a 61-year-old with Parkinson's disease and a deep brain stimulator here for follow-up.   "Generally doing well.  His back is better after injection therapy.  His UroLift was successful and he has nocturia only once or twice per night.  Generally sleeping well.  He has chronic fatigue related to his Parkinson's he developed heartburn within the past year and at times quite severe in the last 6 months tends to happen after he takes his Cialis or Sinemet tablet particularly in the afternoon.  Does not describe nausea or vomiting.  He wishes to discuss it with his neurologist before further work-up but I suggested that he might need an EGD and if so his colonoscopy is due next year.  He monitors his blood pressure at home and its been good.  Needs his statin refilled.    Review of Systems   Constitutional: Positive for fatigue. Negative for appetite change, chills, fever and unexpected weight change.   HENT: Negative for postnasal drip and rhinorrhea.    Eyes: Negative for visual disturbance.   Respiratory: Negative for cough and shortness of breath.    Cardiovascular: Negative for chest pain, palpitations and peripheral edema.   Gastrointestinal: Positive for constipation and heartburn. Negative for abdominal distention, abdominal pain, diarrhea, nausea and vomiting.   Endocrine: Negative for polyuria.   Genitourinary: Positive for frequency. Negative for difficulty urinating and flank pain.   Musculoskeletal: Negative for myalgias.   Neurological: Positive for tremors. Negative for weakness and headaches.   Hematological: Does not bruise/bleed easily.   Psychiatric/Behavioral: Negative for mood changes and sleep disturbance. The patient is not nervous/anxious.        OBJECTIVE:   BP (!) 152/75 (BP Location: Right arm)   Pulse 64   Temp 97.7  F (36.5  C)   Ht 1.695 m (5' 6.75\")   Wt 92.9 kg (204 lb 14.4 oz)   BMI 32.33 kg/m      Physical Exam  Patient is a healthy-appearing man with intermittent resting tremor.  Eyes appear normal.  HEENT exam unremarkable.  Mallampati 1.  Neck is supple no adenopathy " or thyromegaly.  Chest clear to auscultation and percussion.  Cardiac exam is regular with a 2/6 systolic murmur in the aortic area not heard in the carotids or axilla.  No edema.  Normal carotid and posterior tibial pulsations.  Abdomen soft and nontender.  No hot joints.  Alert, oriented, speech fluent, intermittent resting tremor which is asymmetric.  Cranial nerves normal.  Strength and gait normal.  In good spirits.    Diagnostic Test Results:  Labs reviewed in Epic  Recent Results (from the past 720 hour(s))   Lipid panel reflex to direct LDL Fasting    Collection Time: 05/16/22  7:10 AM   Result Value Ref Range    Cholesterol 143 <200 mg/dL    Triglycerides 217 (H) <150 mg/dL    Direct Measure HDL 24 (L) >=40 mg/dL    LDL Cholesterol Calculated 76 <=100 mg/dL    Non HDL Cholesterol 119 <130 mg/dL    Patient Fasting > 8hrs? Yes        ASSESSMENT/PLAN:       ICD-10-CM    1. Wellness examination  Z00.00    2. Seasonal allergic rhinitis, unspecified trigger  J30.2    3. Dyslipidemia  E78.5 simvastatin (ZOCOR) 40 MG tablet   4. Spondylosis of lumbosacral spine without myelopathy  M47.817    5. Presence of other specified functional implants  Z96.89    6. Parkinson's disease (H)  G20    7. Metabolic syndrome  E88.81    8. Labile hypertension due to clinical environment  R09.89    9. Mild intermittent asthma without complication  J45.20    10. Fatty liver  K76.0    11. Routine general medical examination at a health care facility  Z00.00    12. Heartburn  R12    13. Screening for prostate cancer  Z12.5 PSA, screen   14. Screening for diabetes mellitus  Z13.1 Hemoglobin A1c (aka HBA1C)   15. Nonrheumatic aortic valve stenosis  I35.0 Echocardiogram Complete   Discussed his heartburn he will message with his neurologist and contact me likely needs to proceed with EGD and will get a colonoscopy at the same time as he is due next year.  He had a work-up for fatty liver which including included viral serologies and HIV  "with a biopsy revealing fatty liver a number of years ago.  Discussed colorectal and prostate cancer screening.  He has mild to moderate aortic stenosis diagnosed last October needs a follow-up in October.  Monitors blood pressure at home and its been good.  We will get a PSA, hemoglobin A1c today.  He continues to exercise regularly and has a healthy diet.        COUNSELING:   Reviewed preventive health counseling, as reflected in patient instructions  Special attention given to:        Regular exercise       Healthy diet/nutrition       Consider Hep C screening for all patients one time for ages 18-79 years       HIV screeninx in teen years, 1x in adult years, and at intervals if high risk       Colorectal cancer screening       Prostate cancer screening    Estimated body mass index is 32.33 kg/m  as calculated from the following:    Height as of this encounter: 1.695 m (5' 6.75\").    Weight as of this encounter: 92.9 kg (204 lb 14.4 oz).     Weight management plan: Discussed healthy diet and exercise guidelines    He reports that he has never smoked. He does not have any smokeless tobacco history on file.      Counseling Resources:  ATP IV Guidelines  Pooled Cohorts Equation Calculator  FRAX Risk Assessment  ICSI Preventive Guidelines  Dietary Guidelines for Americans,   [employer]'s MyPlate  ASA Prophylaxis  Lung CA Screening    Charles Churchill MD  [facility]  Answers for HPI/ROS submitted by the patient on 2022  If you checked off any problems, how difficult have these problems made it for you to do your work, take care of things at home, or get along with other people?: Somewhat difficult  PHQ9 TOTAL SCORE: 5      "

## 2022-05-20 NOTE — LETTER
May 24, 2022      Rancho Moncada  [id number] [id number]        Dear ,    We are writing to inform you of your test results.    PSA is normal. Hemoglobin A1c slightly high indicating elevated blood sugars. Recommend weight loss, a reduced carbohydrate diet and regular physical activity. Repeat blood work in 6 months.       Resulted Orders   PSA, screen   Result Value Ref Range    Prostate Specific Antigen Screen 0.91 0.00 - 4.00 ug/L    Narrative    Assay Method:  Chemiluminescence using Siemens   Vista analyzer.   Hemoglobin A1c (aka HBA1C)   Result Value Ref Range    Hemoglobin A1C 5.8 (H) 0.0 - 5.6 %      Comment:      Normal <5.7%   Prediabetes 5.7-6.4%    Diabetes 6.5% or higher     Note: Adopted from ADA consensus guidelines.       If you have any questions or concerns, please call the clinic at the number listed above.       Sincerely,      Charles Churchill MD

## 2022-10-03 ENCOUNTER — HEALTH MAINTENANCE LETTER (OUTPATIENT)
Age: 62
End: 2022-10-03

## 2022-12-06 ENCOUNTER — TRANSFERRED RECORDS (OUTPATIENT)
Dept: HEALTH INFORMATION MANAGEMENT | Facility: CLINIC | Age: 62
End: 2022-12-06

## 2022-12-12 ENCOUNTER — TRANSFERRED RECORDS (OUTPATIENT)
Dept: HEALTH INFORMATION MANAGEMENT | Facility: CLINIC | Age: 62
End: 2022-12-12

## 2023-01-16 ENCOUNTER — TRANSFERRED RECORDS (OUTPATIENT)
Dept: HEALTH INFORMATION MANAGEMENT | Facility: CLINIC | Age: 63
End: 2023-01-16

## 2023-01-16 ENCOUNTER — MYC MEDICAL ADVICE (OUTPATIENT)
Dept: FAMILY MEDICINE | Facility: CLINIC | Age: 63
End: 2023-01-16

## 2023-01-16 DIAGNOSIS — N52.9 ERECTILE DYSFUNCTION, UNSPECIFIED ERECTILE DYSFUNCTION TYPE: Primary | ICD-10-CM

## 2023-01-16 NOTE — TELEPHONE ENCOUNTER
Please see my chart message and advise on request.   Last OV 5/20/22  Tadalafil discontinued 5/20/22 due to heartburn

## 2023-01-17 RX ORDER — TADALAFIL 20 MG/1
20 TABLET ORAL DAILY PRN
Qty: 88 TABLET | Refills: 0 | Status: SHIPPED | OUTPATIENT
Start: 2023-01-17 | End: 2023-06-26

## 2023-01-19 ENCOUNTER — TRANSFERRED RECORDS (OUTPATIENT)
Dept: HEALTH INFORMATION MANAGEMENT | Facility: CLINIC | Age: 63
End: 2023-01-19

## 2023-02-02 ENCOUNTER — TRANSFERRED RECORDS (OUTPATIENT)
Dept: HEALTH INFORMATION MANAGEMENT | Facility: CLINIC | Age: 63
End: 2023-02-02
Payer: COMMERCIAL

## 2023-02-08 ENCOUNTER — NURSE TRIAGE (OUTPATIENT)
Dept: NURSING | Facility: CLINIC | Age: 63
End: 2023-02-08
Payer: COMMERCIAL

## 2023-02-08 NOTE — TELEPHONE ENCOUNTER
The patient is calling and is complaining if a positive home Covid-19 test for the last three days    Symptoms started one week ago, and now has a mild cough and loss of smell and taste. No fever    Triage guidelines recommend to discuss with pcp and callback by nurse within 1 hour    Caller is not established with a [facility] provider      Reason for Disposition    [1] HIGH RISK for severe COVID complications (e.g., weak immune system, age > 64 years, obesity with BMI of 30 or higher, pregnant, chronic lung disease or other chronic medical condition) AND [2] COVID symptoms (e.g., cough, fever)  (Exceptions: Already seen by PCP and no new or worsening symptoms.)    Additional Information    Negative: SEVERE difficulty breathing (e.g., struggling for each breath, speaks in single words)    Negative: Difficult to awaken or acting confused (e.g., disoriented, slurred speech)    Negative: Bluish (or gray) lips or face now    Negative: Shock suspected (e.g., cold/pale/clammy skin, too weak to stand, low BP, rapid pulse)    Negative: Sounds like a life-threatening emergency to the triager    Negative: [1] Diagnosed or suspected COVID-19 AND [2] symptoms lasting 3 or more weeks    Negative: [1] COVID-19 exposure AND [2] no symptoms    Negative: COVID-19 vaccine reaction suspected (e.g., fever, headache, muscle aches) occurring 1 to 3 days after getting vaccine    Negative: COVID-19 vaccine, questions about    Negative: [1] Lives with someone known to have influenza (flu test positive) AND [2] flu-like symptoms (e.g., cough, runny nose, sore throat, SOB; with or without fever)    Negative: [1] Adult with possible COVID-19 symptoms AND [2] triager concerned about severity of symptoms or other causes    Negative: COVID-19 and breastfeeding, questions about    Negative: SEVERE or constant chest pain or pressure  (Exception: Mild central chest pain, present only when coughing.)    Negative: MODERATE difficulty breathing  (e.g., speaks in phrases, SOB even at rest, pulse 100-120)    Negative: Headache and stiff neck (can't touch chin to chest)    Negative: Oxygen level (e.g., pulse oximetry) 90 percent or lower    Negative: Chest pain or pressure  (Exception: MILD central chest pain, present only when coughing)    Negative: Patient sounds very sick or weak to the triager    Negative: MILD difficulty breathing (e.g., minimal/no SOB at rest, SOB with walking, pulse <100)    Negative: Fever > 103 F (39.4 C)    Negative: [1] Fever > 101 F (38.3 C) AND [2] over 60 years of age    Negative: [1] Fever > 100.0 F (37.8 C) AND [2] bedridden (e.g., nursing home patient, CVA, chronic illness, recovering from surgery)    Protocols used: CORONAVIRUS (COVID-19) DIAGNOSED OR [id number]

## 2023-02-08 NOTE — TELEPHONE ENCOUNTER
Writer attempted to call back patient to relay Dr. Churchill's reply:    Patient did not answer.  No VM left, as no documentation was made if it was okay to leave a detailed message.  Writer will send a MyChart with education materials on support measures and when to call back or seek medical attention.      DALE Briceno

## 2023-04-09 RX ORDER — FLUOROURACIL 50 MG/G
CREAM TOPICAL
COMMUNITY
Start: 2022-09-01 | End: 2023-07-21

## 2023-04-09 RX ORDER — FLUTICASONE PROPIONATE 50 MCG
SPRAY, SUSPENSION (ML) NASAL
COMMUNITY
Start: 2022-09-27 | End: 2024-09-28

## 2023-04-10 ENCOUNTER — OFFICE VISIT (OUTPATIENT)
Dept: FAMILY MEDICINE | Facility: CLINIC | Age: 63
End: 2023-04-10
Payer: COMMERCIAL

## 2023-04-10 VITALS
OXYGEN SATURATION: 96 % | HEIGHT: 67 IN | RESPIRATION RATE: 18 BRPM | WEIGHT: 211.2 LBS | BODY MASS INDEX: 33.15 KG/M2 | HEART RATE: 63 BPM | TEMPERATURE: 98.2 F | SYSTOLIC BLOOD PRESSURE: 166 MMHG | DIASTOLIC BLOOD PRESSURE: 84 MMHG

## 2023-04-10 DIAGNOSIS — E78.5 DYSLIPIDEMIA: ICD-10-CM

## 2023-04-10 DIAGNOSIS — Z12.5 SCREENING FOR PROSTATE CANCER: ICD-10-CM

## 2023-04-10 DIAGNOSIS — K76.0 FATTY LIVER: ICD-10-CM

## 2023-04-10 DIAGNOSIS — E66.811 CLASS 1 OBESITY DUE TO EXCESS CALORIES WITH SERIOUS COMORBIDITY AND BODY MASS INDEX (BMI) OF 33.0 TO 33.9 IN ADULT: ICD-10-CM

## 2023-04-10 DIAGNOSIS — R35.1 BENIGN PROSTATIC HYPERPLASIA WITH NOCTURIA: ICD-10-CM

## 2023-04-10 DIAGNOSIS — N40.1 BENIGN PROSTATIC HYPERPLASIA WITH NOCTURIA: ICD-10-CM

## 2023-04-10 DIAGNOSIS — E66.09 CLASS 1 OBESITY DUE TO EXCESS CALORIES WITH SERIOUS COMORBIDITY AND BODY MASS INDEX (BMI) OF 33.0 TO 33.9 IN ADULT: ICD-10-CM

## 2023-04-10 DIAGNOSIS — R09.89 LABILE HYPERTENSION DUE TO CLINICAL ENVIRONMENT: ICD-10-CM

## 2023-04-10 DIAGNOSIS — Z12.11 SCREEN FOR COLON CANCER: ICD-10-CM

## 2023-04-10 DIAGNOSIS — K21.9 GASTROESOPHAGEAL REFLUX DISEASE WITHOUT ESOPHAGITIS: Primary | ICD-10-CM

## 2023-04-10 DIAGNOSIS — R73.03 PREDIABETES: ICD-10-CM

## 2023-04-10 PROBLEM — R33.9 RETENTION OF URINE: Status: RESOLVED | Noted: 2022-04-07 | Resolved: 2023-04-10

## 2023-04-10 PROBLEM — F41.9 ANXIETY: Status: RESOLVED | Noted: 2022-04-07 | Resolved: 2023-04-10

## 2023-04-10 PROBLEM — K59.09 CHRONIC CONSTIPATION: Status: ACTIVE | Noted: 2021-10-07

## 2023-04-10 PROBLEM — R07.89 CHEST WALL PAIN: Status: ACTIVE | Noted: 2018-08-28

## 2023-04-10 PROBLEM — J45.909 ASTHMA: Status: RESOLVED | Noted: 2021-12-01 | Resolved: 2023-04-10

## 2023-04-10 LAB
ALBUMIN SERPL BCG-MCNC: 4.7 G/DL (ref 3.5–5.2)
ALP SERPL-CCNC: 74 U/L (ref 40–129)
ALT SERPL W P-5'-P-CCNC: 63 U/L (ref 10–50)
ANION GAP SERPL CALCULATED.3IONS-SCNC: 14 MMOL/L (ref 7–15)
AST SERPL W P-5'-P-CCNC: 68 U/L (ref 10–50)
BILIRUB SERPL-MCNC: 0.4 MG/DL
BUN SERPL-MCNC: 19.3 MG/DL (ref 8–23)
CALCIUM SERPL-MCNC: 9.5 MG/DL (ref 8.8–10.2)
CHLORIDE SERPL-SCNC: 103 MMOL/L (ref 98–107)
CREAT SERPL-MCNC: 1.13 MG/DL (ref 0.67–1.17)
DEPRECATED HCO3 PLAS-SCNC: 24 MMOL/L (ref 22–29)
ERYTHROCYTE [DISTWIDTH] IN BLOOD BY AUTOMATED COUNT: 12.7 % (ref 10–15)
GFR SERPL CREATININE-BSD FRML MDRD: 74 ML/MIN/1.73M2
GLUCOSE SERPL-MCNC: 101 MG/DL (ref 70–99)
HBA1C MFR BLD: 5.8 % (ref 0–5.6)
HCT VFR BLD AUTO: 48.5 % (ref 40–53)
HGB BLD-MCNC: 16.4 G/DL (ref 13.3–17.7)
MCH RBC QN AUTO: 29.4 PG (ref 26.5–33)
MCHC RBC AUTO-ENTMCNC: 33.8 G/DL (ref 31.5–36.5)
MCV RBC AUTO: 87 FL (ref 78–100)
PLATELET # BLD AUTO: 221 10E3/UL (ref 150–450)
POTASSIUM SERPL-SCNC: 4.3 MMOL/L (ref 3.4–5.3)
PROT SERPL-MCNC: 7.2 G/DL (ref 6.4–8.3)
RBC # BLD AUTO: 5.57 10E6/UL (ref 4.4–5.9)
SODIUM SERPL-SCNC: 141 MMOL/L (ref 136–145)
WBC # BLD AUTO: 7.1 10E3/UL (ref 4–11)

## 2023-04-10 PROCEDURE — 36415 COLL VENOUS BLD VENIPUNCTURE: CPT | Performed by: INTERNAL MEDICINE

## 2023-04-10 PROCEDURE — 85027 COMPLETE CBC AUTOMATED: CPT | Performed by: INTERNAL MEDICINE

## 2023-04-10 PROCEDURE — 99214 OFFICE O/P EST MOD 30 MIN: CPT | Performed by: INTERNAL MEDICINE

## 2023-04-10 PROCEDURE — 83036 HEMOGLOBIN GLYCOSYLATED A1C: CPT | Performed by: INTERNAL MEDICINE

## 2023-04-10 PROCEDURE — 80053 COMPREHEN METABOLIC PANEL: CPT | Performed by: INTERNAL MEDICINE

## 2023-04-10 RX ORDER — SIMVASTATIN 40 MG
TABLET ORAL
Qty: 90 TABLET | Refills: 3 | Status: SHIPPED | OUTPATIENT
Start: 2023-04-10 | End: 2024-06-03

## 2023-04-10 ASSESSMENT — ENCOUNTER SYMPTOMS
VOMITING: 0
HEADACHES: 0
PALPITATIONS: 0
DYSURIA: 0
APPETITE CHANGE: 0
FREQUENCY: 1
DIARRHEA: 0
FATIGUE: 1
COUGH: 0
HEMATOCHEZIA: 0
NAUSEA: 1
DIFFICULTY URINATING: 0
ABDOMINAL DISTENTION: 0
HEARTBURN: 1
CONSTIPATION: 0
NERVOUS/ANXIOUS: 0
FEVER: 0
CHILLS: 0
SHORTNESS OF BREATH: 0
UNEXPECTED WEIGHT CHANGE: 0
MYALGIAS: 0

## 2023-04-10 ASSESSMENT — ASTHMA QUESTIONNAIRES
ACT_TOTALSCORE: 22
QUESTION_1 LAST FOUR WEEKS HOW MUCH OF THE TIME DID YOUR ASTHMA KEEP YOU FROM GETTING AS MUCH DONE AT WORK, SCHOOL OR AT HOME: NONE OF THE TIME
QUESTION_3 LAST FOUR WEEKS HOW OFTEN DID YOUR ASTHMA SYMPTOMS (WHEEZING, COUGHING, SHORTNESS OF BREATH, CHEST TIGHTNESS OR PAIN) WAKE YOU UP AT NIGHT OR EARLIER THAN USUAL IN THE MORNING: NOT AT ALL
ACT_TOTALSCORE: 22
QUESTION_5 LAST FOUR WEEKS HOW WOULD YOU RATE YOUR ASTHMA CONTROL: WELL CONTROLLED
QUESTION_4 LAST FOUR WEEKS HOW OFTEN HAVE YOU USED YOUR RESCUE INHALER OR NEBULIZER MEDICATION (SUCH AS ALBUTEROL): ONCE A WEEK OR LESS
EMERGENCY_ROOM_LAST_YEAR_TOTAL: ONE
QUESTION_2 LAST FOUR WEEKS HOW OFTEN HAVE YOU HAD SHORTNESS OF BREATH: ONCE OR TWICE A WEEK

## 2023-04-10 ASSESSMENT — PATIENT HEALTH QUESTIONNAIRE - PHQ9
10. IF YOU CHECKED OFF ANY PROBLEMS, HOW DIFFICULT HAVE THESE PROBLEMS MADE IT FOR YOU TO DO YOUR WORK, TAKE CARE OF THINGS AT HOME, OR GET ALONG WITH OTHER PEOPLE: SOMEWHAT DIFFICULT
SUM OF ALL RESPONSES TO PHQ QUESTIONS 1-9: 4
SUM OF ALL RESPONSES TO PHQ QUESTIONS 1-9: 4

## 2023-04-10 NOTE — PROGRESS NOTES
"  Assessment & Plan   Problem List Items Addressed This Visit        Digestive    Gastroesophageal reflux disease - Primary     Intermittent symptoms treated with PPI  Had a week of severe symptoms with heartburn and nausea but no vomiting, asymptomatic at this.         Relevant Medications    omeprazole (PRILOSEC) 20 MG DR capsule    Other Relevant Orders    Adult GI  Referral - Procedure Only    Comprehensive metabolic panel (BMP + Alb, Alk Phos, ALT, AST, Total. Bili, TP)    CBC with platelets (Completed)    Fatty liver     -Liver biopsy 1984         Relevant Orders    Comprehensive metabolic panel (BMP + Alb, Alk Phos, ALT, AST, Total. 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  Patient will monitor blood pressure at home and get the readings to me            Other    Benign prostatic hyperplasia with nocturia     -Urolift 10/15/21  -TURP recommended        Other Visit Diagnoses     Screen for colon cancer        Prediabetes        Relevant Orders    Hemoglobin A1c (Completed)    Screening for prostate cancer        Relevant Orders    PSA, screen                    BMI:   Estimated body mass index is 33.34 kg/m  as calculated from the following:    Height as of this encounter: 1.695 m (5' 6.73\").    Weight as of this encounter: 95.8 kg (211 lb 3.2 oz).   Weight management plan: Patient referred to endocrine and/or weight management " specialty        Charles Churchill MD  [facility] [place]    Kwame Vickers is a 62 year old, presenting for the following health issues:  Gastrointestinal Problem (Acid reflux)         View : No data to display.              History of Present Illness       Reason for visit:  Heartburn    He eats 2-3 servings of fruits and vegetables daily.He consumes 0 sweetened beverage(s) daily.He exercises with enough effort to increase his heart rate 10 to 19 minutes per day.  He exercises with enough effort to increase his heart rate 3 or less days per week.   He is taking medications regularly.    Today's PHQ-9         PHQ-9 Total Score: 4    PHQ-9 Q9 Thoughts of better off dead/self-harm past 2 weeks :   Not at all    How difficult have these problems made it for you to do your work, take care of things at home, or get along with other people: Somewhat difficult     Patient has had intermittent heartburn for a number of years.  He takes omeprazole when needed.  He thinks he takes omeprazole on 30 or 40% of days.  Had an episode of a week of more severe persistent heartburn with nausea but no vomiting.  Not provoked by food generally although chocolate tends to provoke acid reflux for him.  Not worse with eating or lying down no chest pain or dyspnea.  He came in today because he is due for colonoscopy and wondered about adding on an upper endoscopy.          Review of Systems   Constitutional: Positive for fatigue. Negative for appetite change, chills, fever and unexpected weight change.   HENT: Negative for congestion.    Respiratory: Negative for cough and shortness of breath.    Cardiovascular: Negative for chest pain, palpitations and peripheral edema.   Gastrointestinal: Positive for heartburn and nausea. Negative for abdominal distention, constipation, diarrhea, hematochezia and vomiting.   Endocrine: Negative for polyuria.   Genitourinary: Positive for frequency and urgency. Negative for  "difficulty urinating and dysuria.   Musculoskeletal: Negative for myalgias.   Neurological: Negative for headaches.   Psychiatric/Behavioral: Negative for mood changes and suicidal ideas. The patient is not nervous/anxious.             Objective    BP (!) 166/84   Pulse 63   Temp 98.2  F (36.8  C)   Resp 18   Ht 1.695 m (5' 6.73\")   Wt 95.8 kg (211 lb 3.2 oz)   SpO2 96%   BMI 33.34 kg/m    Body mass index is 33.34 kg/m .  Physical Exam   Appears comfortable and in no distress  HEENT exam unremarkable  Neck is thick no adenopathy or thyromegaly  Lungs clear  Device left chest wall  Cardiac exam regular no murmur or edema.  Normal carotid and posterior tibial pulsations  Abdomen is soft and nondistended.  Minimal epigastric tenderness.  No CVA tenderness  Alert, oriented, speech fluent, tremor left hand                    "

## 2023-04-10 NOTE — ASSESSMENT & PLAN NOTE
-Controlled with Simvastatin 40 mg daily  
-Liver biopsy 1984  
-Urolift 10/15/21  -TURP recommended  
Home blood pressures have been well controlled in the past.  Patient will monitor blood pressure at home and get the readings to me  
Intermittent symptoms treated with PPI  Had a week of severe symptoms with heartburn and nausea but no vomiting, asymptomatic at this.  
Light tobacco smoker

## 2023-04-11 NOTE — RESULT ENCOUNTER NOTE
Dear Rancho,     Here are your recent results.  AST and ALT slightly elevated consistent with known fatty liver disease.  Prediabetes unchanged.  I did send a referral to the weight management program and they should receive a call about it.  Please let me know if your symptoms are not improving.  Look forward to seeing your endoscopy reports    Please let us know if you have any questions or concerns.    Regards,  Charles Churchill MD

## 2023-04-20 ENCOUNTER — PATIENT OUTREACH (OUTPATIENT)
Dept: CARE COORDINATION | Facility: CLINIC | Age: 63
End: 2023-04-20
Payer: COMMERCIAL

## 2023-04-22 ENCOUNTER — MYC MEDICAL ADVICE (OUTPATIENT)
Dept: FAMILY MEDICINE | Facility: CLINIC | Age: 63
End: 2023-04-22
Payer: COMMERCIAL

## 2023-04-22 DIAGNOSIS — N52.9 ERECTILE DYSFUNCTION, UNSPECIFIED ERECTILE DYSFUNCTION TYPE: Primary | ICD-10-CM

## 2023-05-01 ENCOUNTER — TRANSFERRED RECORDS (OUTPATIENT)
Dept: MULTI SPECIALTY CLINIC | Facility: CLINIC | Age: 63
End: 2023-05-01

## 2023-05-01 ENCOUNTER — LAB (OUTPATIENT)
Dept: LAB | Facility: CLINIC | Age: 63
End: 2023-05-01
Payer: COMMERCIAL

## 2023-05-01 DIAGNOSIS — E78.5 DYSLIPIDEMIA: ICD-10-CM

## 2023-05-01 DIAGNOSIS — Z11.59 NEED FOR HEPATITIS C SCREENING TEST: ICD-10-CM

## 2023-05-01 DIAGNOSIS — N52.9 ERECTILE DYSFUNCTION, UNSPECIFIED ERECTILE DYSFUNCTION TYPE: ICD-10-CM

## 2023-05-01 DIAGNOSIS — Z12.5 SCREENING FOR PROSTATE CANCER: ICD-10-CM

## 2023-05-01 DIAGNOSIS — Z11.4 SCREENING FOR HIV (HUMAN IMMUNODEFICIENCY VIRUS): ICD-10-CM

## 2023-05-01 LAB
CHOLEST SERPL-MCNC: 147 MG/DL
HCV AB SERPL QL IA: NONREACTIVE
HDLC SERPL-MCNC: 26 MG/DL
HIV 1+2 AB+HIV1 P24 AG SERPL QL IA: NONREACTIVE
LDLC SERPL CALC-MCNC: 72 MG/DL
NONHDLC SERPL-MCNC: 121 MG/DL
PSA SERPL DL<=0.01 NG/ML-MCNC: 0.85 NG/ML (ref 0–4.5)
TRIGL SERPL-MCNC: 244 MG/DL

## 2023-05-01 PROCEDURE — G0103 PSA SCREENING: HCPCS

## 2023-05-01 PROCEDURE — 86803 HEPATITIS C AB TEST: CPT

## 2023-05-01 PROCEDURE — 84403 ASSAY OF TOTAL TESTOSTERONE: CPT

## 2023-05-01 PROCEDURE — 36415 COLL VENOUS BLD VENIPUNCTURE: CPT

## 2023-05-01 PROCEDURE — 80061 LIPID PANEL: CPT

## 2023-05-01 PROCEDURE — 87389 HIV-1 AG W/HIV-1&-2 AB AG IA: CPT

## 2023-05-03 LAB — TESTOST SERPL-MCNC: 594 NG/DL (ref 240–950)

## 2023-05-04 ENCOUNTER — PATIENT OUTREACH (OUTPATIENT)
Dept: CARE COORDINATION | Facility: CLINIC | Age: 63
End: 2023-05-04
Payer: COMMERCIAL

## 2023-05-15 ENCOUNTER — TRANSFERRED RECORDS (OUTPATIENT)
Dept: HEALTH INFORMATION MANAGEMENT | Facility: CLINIC | Age: 63
End: 2023-05-15
Payer: COMMERCIAL

## 2023-06-20 ENCOUNTER — TRANSFERRED RECORDS (OUTPATIENT)
Dept: HEALTH INFORMATION MANAGEMENT | Facility: CLINIC | Age: 63
End: 2023-06-20
Payer: COMMERCIAL

## 2023-06-24 ENCOUNTER — MYC MEDICAL ADVICE (OUTPATIENT)
Dept: FAMILY MEDICINE | Facility: CLINIC | Age: 63
End: 2023-06-24
Payer: COMMERCIAL

## 2023-06-24 DIAGNOSIS — N52.9 ERECTILE DYSFUNCTION, UNSPECIFIED ERECTILE DYSFUNCTION TYPE: ICD-10-CM

## 2023-06-26 RX ORDER — DOCUSATE SODIUM 100 MG/1
100-200 CAPSULE, LIQUID FILLED ORAL PRN
COMMUNITY

## 2023-06-26 RX ORDER — FAMOTIDINE 20 MG/1
20 TABLET, FILM COATED ORAL
COMMUNITY
End: 2023-07-21

## 2023-06-26 RX ORDER — AMANTADINE HYDROCHLORIDE 100 MG/1
100 CAPSULE, GELATIN COATED ORAL
COMMUNITY
Start: 2023-05-17 | End: 2023-07-21

## 2023-06-26 RX ORDER — TAMSULOSIN HYDROCHLORIDE 0.4 MG/1
0.4 CAPSULE ORAL DAILY
COMMUNITY
Start: 2023-01-20 | End: 2023-07-21

## 2023-06-26 RX ORDER — TADALAFIL 5 MG/1
5 TABLET ORAL DAILY PRN
Qty: 90 TABLET | Refills: 1 | Status: SHIPPED | OUTPATIENT
Start: 2023-06-26 | End: 2023-06-27

## 2023-06-26 NOTE — TELEPHONE ENCOUNTER
Please see my chart message and advise on medication request.   Tadalafil 20mg last written 1/17/23 Qty #88  Last OV 4/10/23

## 2023-06-27 DIAGNOSIS — N52.9 ERECTILE DYSFUNCTION, UNSPECIFIED ERECTILE DYSFUNCTION TYPE: ICD-10-CM

## 2023-06-27 RX ORDER — TADALAFIL 5 MG/1
5 TABLET ORAL DAILY PRN
Qty: 90 TABLET | Refills: 1 | Status: SHIPPED | OUTPATIENT
Start: 2023-06-27

## 2023-07-15 ASSESSMENT — SLEEP AND FATIGUE QUESTIONNAIRES
HOW LIKELY ARE YOU TO NOD OFF OR FALL ASLEEP WHILE SITTING AND READING: SLIGHT CHANCE OF DOZING
HOW LIKELY ARE YOU TO NOD OFF OR FALL ASLEEP WHILE SITTING AND TALKING TO SOMEONE: WOULD NEVER DOZE
HOW LIKELY ARE YOU TO NOD OFF OR FALL ASLEEP WHILE SITTING INACTIVE IN A PUBLIC PLACE: WOULD NEVER DOZE
HOW LIKELY ARE YOU TO NOD OFF OR FALL ASLEEP WHEN YOU ARE A PASSENGER IN A CAR FOR AN HOUR WITHOUT A BREAK: SLIGHT CHANCE OF DOZING
HOW LIKELY ARE YOU TO NOD OFF OR FALL ASLEEP WHILE LYING DOWN TO REST IN THE AFTERNOON WHEN CIRCUMSTANCES PERMIT: MODERATE CHANCE OF DOZING
HOW LIKELY ARE YOU TO NOD OFF OR FALL ASLEEP WHILE WATCHING TV: SLIGHT CHANCE OF DOZING
HOW LIKELY ARE YOU TO NOD OFF OR FALL ASLEEP WHILE SITTING QUIETLY AFTER LUNCH WITHOUT ALCOHOL: SLIGHT CHANCE OF DOZING
HOW LIKELY ARE YOU TO NOD OFF OR FALL ASLEEP IN A CAR, WHILE STOPPED FOR A FEW MINUTES IN TRAFFIC: WOULD NEVER DOZE

## 2023-07-21 ENCOUNTER — OFFICE VISIT (OUTPATIENT)
Dept: FAMILY MEDICINE | Facility: CLINIC | Age: 63
End: 2023-07-21
Payer: COMMERCIAL

## 2023-07-21 VITALS
WEIGHT: 204.5 LBS | RESPIRATION RATE: 16 BRPM | SYSTOLIC BLOOD PRESSURE: 166 MMHG | OXYGEN SATURATION: 95 % | HEIGHT: 65 IN | TEMPERATURE: 99.3 F | BODY MASS INDEX: 34.07 KG/M2 | HEART RATE: 77 BPM | DIASTOLIC BLOOD PRESSURE: 90 MMHG

## 2023-07-21 DIAGNOSIS — R09.89 LABILE HYPERTENSION DUE TO CLINICAL ENVIRONMENT: ICD-10-CM

## 2023-07-21 DIAGNOSIS — G20.A1 PARKINSON DISEASE (H): ICD-10-CM

## 2023-07-21 DIAGNOSIS — K76.0 FATTY LIVER: ICD-10-CM

## 2023-07-21 DIAGNOSIS — K21.9 GASTROESOPHAGEAL REFLUX DISEASE WITHOUT ESOPHAGITIS: ICD-10-CM

## 2023-07-21 DIAGNOSIS — E78.5 DYSLIPIDEMIA: ICD-10-CM

## 2023-07-21 DIAGNOSIS — E66.811 CLASS 1 OBESITY DUE TO EXCESS CALORIES WITH SERIOUS COMORBIDITY AND BODY MASS INDEX (BMI) OF 33.0 TO 33.9 IN ADULT: ICD-10-CM

## 2023-07-21 DIAGNOSIS — F33.9 RECURRENT DEPRESSIVE DISORDER (H): ICD-10-CM

## 2023-07-21 DIAGNOSIS — E88.810 METABOLIC SYNDROME: Primary | ICD-10-CM

## 2023-07-21 DIAGNOSIS — E66.09 CLASS 1 OBESITY DUE TO EXCESS CALORIES WITH SERIOUS COMORBIDITY AND BODY MASS INDEX (BMI) OF 33.0 TO 33.9 IN ADULT: ICD-10-CM

## 2023-07-21 PROCEDURE — 99215 OFFICE O/P EST HI 40 MIN: CPT | Performed by: FAMILY MEDICINE

## 2023-07-21 NOTE — PATIENT INSTRUCTIONS
100+ grams of protein per day  800 grams of veggies/fruit day      Consider continuous glucose monitoring - Freestyle 2 product line.      Limitless documentary    Why we get sick (about metabolic syndrome)   - Terrance Rodriguez

## 2023-07-21 NOTE — PROGRESS NOTES
"    New Medical Weight Management Consult    PATIENT:  Rancho Moncada  MRN:         [id number]  :         1960  PARKER:         2023    Dear Dr. Churchill,    I had the pleasure of seeing your patient, Rancho Moncada. Full intake/assessment was done to determine barriers to weight loss success and develop a treatment plan. Rancho Moncada is a 63 year old male interested in treatment of medical problems associated with excess weight. He has a height of 5' 5.3\", a weight of 204 lbs 8 oz, and the calculated Body mass index is 33.72 kg/m .    ASSESSMENT/PLAN:  Parkinson disease (H)  The patient's motivation for physical activity is in part due to his desire to maintain his strength.  I believe he is correct that remaining physically active and specifically his strength training regimen is of benefit with respect to his parkinsonian symptoms.  This will also help with weight loss.    Recurrent depressive disorder (H)  Not a concern today.    Class 1 obesity due to excess calories with serious comorbidity and body mass index (BMI) of 33.0 to 33.9 in adult  63 year old year old male in clinic today to discuss treatment of the following conditions through diet and lifestyle modification and weight loss:  1. Metabolic syndrome    2. Fatty liver    3. Class 1 obesity due to excess calories with serious comorbidity and body mass index (BMI) of 33.0 to 33.9 in adult    4. Gastroesophageal reflux disease without esophagitis    5. Dyslipidemia    6. Labile hypertension due to clinical environment    7. Parkinson disease (H)    8. Recurrent depressive disorder (H)      Intake: 63-year-old man who meets criteria for metabolic syndrome with Parkinson's disease for the last decade presenting for discussion of metabolic health and weight loss.  The patient's BMI overstates his body habitus.  He appears physically fit and I estimate his percent body fat somewhere between 25 and 30%.  He does have central adiposity and " "laboratory testing consistent with metabolic syndrome.  We do lengthy discussion regarding this condition and the types of nutrition that should help.  I recommended a protein rich, fibrous vegetable rich (and calorically restricted) nutrition plan.  I think he may benefit from off label use of continuous glucose monitoring.  Given his objectives as well as his weightlifting regimen, I am hesitant to prescribe a medicine such as semaglutide for weight loss as I think ultimately it will be counterproductive and, given his body habitus, I think it is unlikely that he needs such a potent intervention.  Follow-up: TBD.  Consider trial of metformin.  Continue exercise regimen as this is likely neuroprotective as well as beneficial in combating metabolic disease.    FOLLOW-UP:   12 weeks?    SUBJECTIVE:     He has the following co-morbidities:        7/15/2023     4:15 PM   --   I have the following health issues associated with obesity Pre-Diabetes    High Cholesterol    GERD (Reflux)    Fatty Liver   I have the following symptoms associated with obesity Back Pain    Fatigue     Narrative History:    - goal: lose    - frequent exercisce.  Cardio and free weights.      - limited nutrition. Picky eater.  He has change portions.         No data to display                  7/15/2023     4:15 PM   Referring Provider   Please name the provider who referred you to Medical Weight Management  If you do not know, please answer \"I Don't Know\" Dr. Harish Churchill         7/15/2023     4:15 PM   Weight History   How concerned are you about your weight? Somewhat Concerned   I became overweight As an Adult   The following factors have contributed to my weight gain Eating Wrong Types of Food    Eating Too Much    Stress   I have tried the following methods to lose weight Exercise   My lowest weight since age 20 was 150   My highest weight since age 20 was 211   The most weight I have ever lost was (lbs) 7   I have the following family " history of obesity/being overweight Unknown (adopted)   How has your weight changed over the last year? Gained   How many pounds? 4         7/15/2023     4:15 PM   Diet Recall Review with Patient   If you do eat breakfast, what types of food do you eat? Eggs, Ham, Cheese, Cereal, Fruit   If you do eat lunch, what types of food do you typically eat? Salads, Meats, Milk, Cheese   If you do eat supper, what types of food do you typically eat? Meat, Potatoes, Vegetables, Bread   If you do snack, what types of food do you typically eat? Popcorn, crackers, sweets, ice cream, chocolate   How many glasses of juice do you drink in a typical day? 0   How many of glasses of milk do you drink in a typical day? 1   If you do drink milk, what type? 1%   How many 8oz glasses of sugar containing drinks such as Dov-Aid/sweet tea do you drink in a day? 0   How many cans/bottles of sugar pop/soda/tea/sports drinks do you drink in a day? 0   How many cans/bottles of diet pop/soda/tea or sports drink do you drink in a day? 0   How often do you have a drink of alcohol? 2-3 TImes a Week   If you do drink, how many drinks might you have in a day? 1 or 2         7/15/2023     4:15 PM   Eating Habits   Generally, my meals include foods like these bread, pasta, rice, potatoes, corn, crackers, sweet dessert, pop, or juice Almost Everyday   Generally, my meals include foods like these fried meats, brats, burgers, french fries, pizza, cheese, chips, or ice cream Almost Everyday   Eat fast food (like McDonalds, [employer], Taco Bell) Never   Eat at a buffet or sit-down restaurant Never   Eat most of my meals in front of the TV or computer Less Than Weekly   Often skip meals, eat at random times, have no regular eating times Never   Rarely sit down for a meal but snack or graze throughout Never   Eat extra snacks between meals Almost Everyday   Eat most of my food at the end of the day Never   Eat in the middle of the night or wake up at night  to eat Never   Eat extra snacks to prevent or correct low blood sugar Never   Eat to prevent acid reflux or stomach pain Less Than Weekly   Worry about not having enough food to eat Never   I eat when I am depressed A Few Times a Week   I eat when I am stressed Almost Everyday   I eat when I am bored Almost Everyday   I eat when I am anxious Almost Everyday   I eat when I am happy or as a reward Once a Week   I feel hungry all the time even if I just have eaten Never   Feeling full is important to me Almost Everyday   I finish all the food on my plate even if I am already full Once a Week   I can't resist eating delicious food or walk past the good food/smell Once a Week   I eat/snack without noticing that I am eating Less Than Weekly   I eat when I am preparing the meal A Few Times a Week   I eat more than usual when I see others eating Less Than Weekly   I have trouble not eating sweets, ice cream, cookies, or chips if they are around the house Almost Everyday   I think about food all day A Few Times a Week   What foods, if any, do you crave? Sweets/Candy/Chocolate   Please list any other foods you crave? Ice Cream         7/15/2023     4:15 PM   Amount of Food   I feel out of control when eating Never   I eat a large amount of food, like a loaf of bread, a box of cookies, a pint/quart of ice cream, all at once Never   I eat a large amount of food even when I am not hungry Weekly   I eat rapidly Almost Everyday   I eat alone because I feel embarrassed and do not want others to see how much I have eaten Never   I eat until I am uncomfortably full Monthly   I feel bad, disgusted, or guilty after I overeat Weekly         7/15/2023     4:15 PM   Activity/Exercise History   How much of a typical 12 hour day do you spend sitting? Less Than Half the Day   How much of a typical 12 hour day do you spend lying down? Less Than Half the Day   How much of a typical day do you spend walking/standing? Less Than Half the Day    How many hours (not including work) do you spend on the TV/Video Games/Computer/Tablet/Phone? 4-5 Hours   How many times a week are you active for the purpose of exercise? 4-5 TImes a Week   What keeps you from being more active? Other   How many total minutes do you spend doing some activity for the purpose of exercising when you exercise? More Than 30 Minutes     PAST MEDICAL HISTORY:  Past Medical History:   Diagnosis Date    Hypertension     Uncomplicated asthma          7/15/2023     4:15 PM   Work/Social History Reviewed With Patient   My employment status is Retired   What is your marital status? /In a Relationship   If in a relationship, is your significant other overweight? Yes   If you have children, are they overweight? No   Who do you live with? Spouse   Who does the food shopping? Both         7/15/2023     4:15 PM   Mental Health History Reviewed With Patient   Have you ever been physically or sexually abused? No   How often in the past 2 weeks have you felt little interest or pleasure in doing things? For Several Days   Over the past 2 weeks how often have you felt down, depressed, or hopeless? Not at all         7/15/2023     4:15 PM   Sleep History Reviewed With Patient   How many hours do you sleep at night? 7     MEDICATIONS:   Current Outpatient Medications   Medication Sig Dispense Refill    albuterol (PROAIR HFA/PROVENTIL HFA/VENTOLIN HFA) 108 (90 Base) MCG/ACT inhaler Ventolin HFA 90 mcg/actuation aerosol inhaler   INHALE 2 PUFFS FOUR TIMES DAILY AS NEEDED      carbidopa-levodopa (SINEMET)  MG tablet carbidopa 25 mg-levodopa 100 mg tablet   TAKE 2 TABLETS BY MOUTH 4 TIMES A DAY      docusate sodium (DSS) 100 MG capsule Take 100-200 mg by mouth as needed      fluticasone (FLONASE) 50 MCG/ACT nasal spray       Glycopyrronium Tosylate (QBREXZA) 2.4 % PADS Qbrexza 2.4 % towelette      mometasone (NASONEX) 50 MCG/ACT nasal spray       simvastatin (ZOCOR) 40 MG tablet TAKE 1 TABLET BY  MOUTH DAILY AT BEDTIME 90 tablet 3    tadalafil (CIALIS) 5 MG tablet Take 1 tablet (5 mg) by mouth daily as needed (sexual activity) 90 tablet 1    MAGNESIUM CITRATE PO Take 900 mg by mouth daily       ALLERGIES:   Allergies   Allergen Reactions    Dust Mite Extract Itching     Sneezing, itching eyes  Sneezing, itching eyes      Mold      Other reaction(s): Sneezing..    Molds & Smuts Itching     Sneezing, itchy eyes  Sneezing, itchy eyes      Pollen Extract     Erythromycin Other (See Comments) and Rash     Other reaction(s): Erythema     PHYSICAL EXAM:  Physical Exam  Nursing note reviewed.   Constitutional:       General: He is not in acute distress.     Appearance: Normal appearance. He is not ill-appearing.   HENT:      Head: Normocephalic and atraumatic.   Eyes:      Extraocular Movements: Extraocular movements intact.      Conjunctiva/sclera: Conjunctivae normal.   Pulmonary:      Effort: Pulmonary effort is normal.   Neurological:      Mental Status: He is alert and oriented to person, place, and time.   Psychiatric:         Attention and Perception: Attention normal.         Mood and Affect: Mood normal.         Speech: Speech normal.         Thought Content: Thought content normal.       55 minutes spent on the date of the encounter doing chart review, history and exam, documentation and further activities per the note    This note has been dictated using voice recognition software. Any grammatical or context distortions are unintentional and inherent to the software    Sincerely,    Raghavendra Carballo MD  Diplomate - American Board of Obesity Medicine  Diplomate - American Board of Family Medicine  [facility] - Comprehensive Weight Management

## 2023-07-22 NOTE — ASSESSMENT & PLAN NOTE
63 year old year old male in clinic today to discuss treatment of the following conditions through diet and lifestyle modification and weight loss:  1. Metabolic syndrome    2. Fatty liver    3. Class 1 obesity due to excess calories with serious comorbidity and body mass index (BMI) of 33.0 to 33.9 in adult    4. Gastroesophageal reflux disease without esophagitis    5. Dyslipidemia    6. Labile hypertension due to clinical environment    7. Parkinson disease (H)    8. Recurrent depressive disorder (H)      Intake: 63-year-old man who meets criteria for metabolic syndrome with Parkinson's disease for the last decade presenting for discussion of metabolic health and weight loss.  The patient's BMI overstates his body habitus.  He appears physically fit and I estimate his percent body fat somewhere between 25 and 30%.  He does have central adiposity and laboratory testing consistent with metabolic syndrome.  We do lengthy discussion regarding this condition and the types of nutrition that should help.  I recommended a protein rich, fibrous vegetable rich (and calorically restricted) nutrition plan.  I think he may benefit from off label use of continuous glucose monitoring.  Given his objectives as well as his weightlifting regimen, I am hesitant to prescribe a medicine such as semaglutide for weight loss as I think ultimately it will be counterproductive and, given his body habitus, I think it is unlikely that he needs such a potent intervention.  Follow-up: TBD.  Consider trial of metformin.  Continue exercise regimen as this is likely neuroprotective as well as beneficial in combating metabolic disease.

## 2023-07-22 NOTE — ASSESSMENT & PLAN NOTE
The patient's motivation for physical activity is in part due to his desire to maintain his strength.  I believe he is correct that remaining physically active and specifically his strength training regimen is of benefit with respect to his parkinsonian symptoms.  This will also help with weight loss.

## 2023-08-08 ENCOUNTER — TRANSFERRED RECORDS (OUTPATIENT)
Dept: HEALTH INFORMATION MANAGEMENT | Facility: CLINIC | Age: 63
End: 2023-08-08
Payer: COMMERCIAL

## 2023-08-13 ENCOUNTER — HEALTH MAINTENANCE LETTER (OUTPATIENT)
Age: 63
End: 2023-08-13

## 2023-10-21 RX ORDER — TAMSULOSIN HYDROCHLORIDE 0.4 MG/1
1 CAPSULE ORAL DAILY
COMMUNITY
End: 2023-10-26

## 2023-10-21 RX ORDER — SILDENAFIL 50 MG/1
50 TABLET, FILM COATED ORAL DAILY PRN
COMMUNITY
End: 2023-10-26

## 2023-10-21 RX ORDER — FLUOROURACIL 50 MG/G
CREAM TOPICAL DAILY
COMMUNITY
End: 2023-10-26

## 2023-10-21 RX ORDER — LEVODOPA AND CARBIDOPA 195; 48.75 MG/1; MG/1
2 CAPSULE, EXTENDED RELEASE ORAL
COMMUNITY
Start: 2023-05-17

## 2023-10-21 RX ORDER — OMEPRAZOLE 20 MG/1
20 TABLET, DELAYED RELEASE ORAL DAILY
COMMUNITY
End: 2023-10-26

## 2023-10-23 ASSESSMENT — ASTHMA QUESTIONNAIRES: ACT_TOTALSCORE: 23

## 2023-10-26 ENCOUNTER — OFFICE VISIT (OUTPATIENT)
Dept: FAMILY MEDICINE | Facility: CLINIC | Age: 63
End: 2023-10-26
Payer: COMMERCIAL

## 2023-10-26 VITALS
WEIGHT: 205 LBS | BODY MASS INDEX: 34.16 KG/M2 | TEMPERATURE: 97.4 F | SYSTOLIC BLOOD PRESSURE: 131 MMHG | HEART RATE: 72 BPM | HEIGHT: 65 IN | DIASTOLIC BLOOD PRESSURE: 73 MMHG | OXYGEN SATURATION: 96 % | RESPIRATION RATE: 20 BRPM

## 2023-10-26 DIAGNOSIS — E66.811 CLASS 1 OBESITY DUE TO EXCESS CALORIES WITH SERIOUS COMORBIDITY AND BODY MASS INDEX (BMI) OF 33.0 TO 33.9 IN ADULT: ICD-10-CM

## 2023-10-26 DIAGNOSIS — K21.00 GASTROESOPHAGEAL REFLUX DISEASE WITH ESOPHAGITIS WITHOUT HEMORRHAGE: Primary | ICD-10-CM

## 2023-10-26 DIAGNOSIS — E66.09 CLASS 1 OBESITY DUE TO EXCESS CALORIES WITH SERIOUS COMORBIDITY AND BODY MASS INDEX (BMI) OF 33.0 TO 33.9 IN ADULT: ICD-10-CM

## 2023-10-26 DIAGNOSIS — G20.B2 PARKINSON'S DISEASE WITH DYSKINESIA AND FLUCTUATING MANIFESTATIONS (H): ICD-10-CM

## 2023-10-26 DIAGNOSIS — K59.09 CHRONIC CONSTIPATION: ICD-10-CM

## 2023-10-26 PROCEDURE — 90682 RIV4 VACC RECOMBINANT DNA IM: CPT | Performed by: INTERNAL MEDICINE

## 2023-10-26 PROCEDURE — 99214 OFFICE O/P EST MOD 30 MIN: CPT | Mod: 25 | Performed by: INTERNAL MEDICINE

## 2023-10-26 PROCEDURE — 90471 IMMUNIZATION ADMIN: CPT | Performed by: INTERNAL MEDICINE

## 2023-10-26 RX ORDER — OMEPRAZOLE 40 MG/1
40 CAPSULE, DELAYED RELEASE ORAL 2 TIMES DAILY
Qty: 180 CAPSULE | Refills: 3 | Status: SHIPPED | OUTPATIENT
Start: 2023-10-26

## 2023-10-26 ASSESSMENT — ENCOUNTER SYMPTOMS
SHORTNESS OF BREATH: 0
NAUSEA: 0
PALPITATIONS: 0
WEAKNESS: 0
MYALGIAS: 0
TREMORS: 1
DYSURIA: 0
DIARRHEA: 0
COUGH: 0
HEARTBURN: 1
ABDOMINAL DISTENTION: 0
HEMATOCHEZIA: 0
VOMITING: 0
CONSTIPATION: 1
APPETITE CHANGE: 0
DIFFICULTY URINATING: 0
ABDOMINAL PAIN: 0
UNEXPECTED WEIGHT CHANGE: 0
FATIGUE: 0

## 2023-10-26 ASSESSMENT — PATIENT HEALTH QUESTIONNAIRE - PHQ9
SUM OF ALL RESPONSES TO PHQ QUESTIONS 1-9: 3
10. IF YOU CHECKED OFF ANY PROBLEMS, HOW DIFFICULT HAVE THESE PROBLEMS MADE IT FOR YOU TO DO YOUR WORK, TAKE CARE OF THINGS AT HOME, OR GET ALONG WITH OTHER PEOPLE: NOT DIFFICULT AT ALL
SUM OF ALL RESPONSES TO PHQ QUESTIONS 1-9: 3

## 2023-10-26 NOTE — PROGRESS NOTES
"  Assessment & Plan   Problem List Items Addressed This Visit       Gastroesophageal reflux disease - Primary     EGD 7/17/23 GERD on biopsy  Increase to BID PPI 10/26/23  Patient will call if not promptly better and would be inclined to proceed with referral for considering surgical therapy         Relevant Medications    omeprazole (PRILOSEC) 40 MG DR capsule    Parkinson disease     DBS 2018         Relevant Medications    RYTARY 48. MG CPCR per CR capsule    Chronic constipation    Class 1 obesity due to excess calories with serious comorbidity and body mass index (BMI) of 33.0 to 33.9 in adult     Had bariatrics consultation.           We will call if not promptly better           BMI:   Estimated body mass index is 33.8 kg/m  as calculated from the following:    Height as of this encounter: 1.659 m (5' 5.3\").    Weight as of this encounter: 93 kg (205 lb).   Weight management plan: Patient had bariatrics consultation    We will follow-up when he returns from [facility]    Charles Churchill MD  [facility]    Kwame Michel is a 63 year old, presenting for the following health issues:  Results (Pt here for Follow-up on recent EGD results) and Gastrophageal Reflux (Pt also here for Follow-up on his reflux sx)        7/21/2023     1:59 PM   Additional Questions   Roomed by sac   Accompanied by self       History of Present Illness       Reason for visit:  Review results from stomach scope and acid reflux treatment    He eats 2-3 servings of fruits and vegetables daily.He consumes 0 sweetened beverage(s) daily.He exercises with enough effort to increase his heart rate 10 to 19 minutes per day.  He exercises with enough effort to increase his heart rate 3 or less days per week.   He is taking medications regularly.  Patient has ongoing acid reflux issues.  He had an EGD which was normal but showed reflux on biopsy of the GE junction.  He has been using as needed omeprazole " "which is helpful but has frequent heartburn several times per week with acid regurgitation but no vomiting, weight loss, odynophagia, or dysphagia.  He is otherwise been feeling well.  Had a bariatrics that consultation was not felt to be a medical candidate.              Review of Systems   Constitutional:  Negative for appetite change, fatigue and unexpected weight change.   Respiratory:  Negative for cough and shortness of breath.    Cardiovascular:  Negative for chest pain, palpitations and peripheral edema.   Gastrointestinal:  Positive for constipation and heartburn. Negative for abdominal distention, abdominal pain, diarrhea, hematochezia, nausea and vomiting.        No dysphagia or odynophagia   Endocrine: Negative for polyuria.   Genitourinary:  Negative for difficulty urinating and dysuria.   Musculoskeletal:  Negative for myalgias.        No falls   Neurological:  Positive for tremors. Negative for weakness.            Objective    /73 (BP Location: Right arm, Patient Position: Sitting, Cuff Size: Adult Large)   Pulse 72   Temp 97.4  F (36.3  C) (Tympanic)   Resp 20   Ht 1.659 m (5' 5.3\")   Wt 93 kg (205 lb)   SpO2 96%   BMI 33.80 kg/m    Body mass index is 33.8 kg/m .  Physical Exam   Healthy-appearing man in no distress.  Alert and oriented.  In good spirits.  Does not have parkinsonian facies.  Minimal tremor more on the left than right.  Gait appears normal                      "

## 2023-10-26 NOTE — ASSESSMENT & PLAN NOTE
EGD 7/17/23 GERD on biopsy  Increase to BID PPI 10/26/23  Patient will call if not promptly better and would be inclined to proceed with referral for considering surgical therapy

## 2023-11-13 DIAGNOSIS — N52.9 ERECTILE DYSFUNCTION, UNSPECIFIED ERECTILE DYSFUNCTION TYPE: ICD-10-CM

## 2023-11-13 RX ORDER — TADALAFIL 5 MG/1
5 TABLET ORAL DAILY PRN
Qty: 90 TABLET | Refills: 0 | OUTPATIENT
Start: 2023-11-13

## 2024-02-08 ENCOUNTER — MYC REFILL (OUTPATIENT)
Dept: FAMILY MEDICINE | Facility: CLINIC | Age: 64
End: 2024-02-08
Payer: COMMERCIAL

## 2024-02-08 DIAGNOSIS — J45.20 MILD INTERMITTENT ASTHMA WITHOUT COMPLICATION: Primary | ICD-10-CM

## 2024-02-09 RX ORDER — ALBUTEROL SULFATE 90 UG/1
AEROSOL, METERED RESPIRATORY (INHALATION)
Qty: 18 G | Refills: 1 | Status: SHIPPED | OUTPATIENT
Start: 2024-02-09

## 2024-02-09 NOTE — TELEPHONE ENCOUNTER
Routing refill request to provider for review/approval because:  Drug not on the [facility] refill protocol   JDL o/c  Last Written Prescription Date: 9/9/21  historical   Last office visit: 10/26/2023

## 2024-03-29 ENCOUNTER — TELEPHONE (OUTPATIENT)
Dept: FAMILY MEDICINE | Facility: CLINIC | Age: 64
End: 2024-03-29
Payer: COMMERCIAL

## 2024-03-29 DIAGNOSIS — R73.03 PREDIABETES: ICD-10-CM

## 2024-03-29 DIAGNOSIS — E78.5 DYSLIPIDEMIA: Primary | ICD-10-CM

## 2024-03-29 DIAGNOSIS — K76.0 FATTY LIVER: ICD-10-CM

## 2024-03-29 DIAGNOSIS — N52.9 ERECTILE DYSFUNCTION, UNSPECIFIED ERECTILE DYSFUNCTION TYPE: ICD-10-CM

## 2024-03-29 NOTE — TELEPHONE ENCOUNTER
Order/Referral Request    Who is requesting: patient     Orders being requested: fasting labs/ testosterone and A1C    Reason service is needed/diagnosis: physical    When are orders needed by: 04/11    Has this been discussed with Provider: Yes    Does patient have a preference on a Group/Provider/Facility? no    Does patient have an appointment scheduled?: Yes: 4/22    Where to send orders: Place orders within Epic    Could we send this information to you in [facility] or would you prefer to receive a phone call?:   Patient would prefer a phone call   Okay to leave a detailed message?: Yes at Home number on file [phone] (home)

## 2024-04-12 ENCOUNTER — LAB (OUTPATIENT)
Dept: LAB | Facility: CLINIC | Age: 64
End: 2024-04-12
Payer: COMMERCIAL

## 2024-04-12 DIAGNOSIS — N52.9 ERECTILE DYSFUNCTION, UNSPECIFIED ERECTILE DYSFUNCTION TYPE: ICD-10-CM

## 2024-04-12 DIAGNOSIS — K76.0 FATTY LIVER: ICD-10-CM

## 2024-04-12 DIAGNOSIS — E78.5 DYSLIPIDEMIA: ICD-10-CM

## 2024-04-12 DIAGNOSIS — R73.03 PREDIABETES: ICD-10-CM

## 2024-04-12 LAB
ALT SERPL W P-5'-P-CCNC: 21 U/L (ref 0–70)
CHOLEST SERPL-MCNC: 147 MG/DL
FASTING STATUS PATIENT QL REPORTED: YES
HBA1C MFR BLD: 5.8 % (ref 0–5.6)
HDLC SERPL-MCNC: 25 MG/DL
LDLC SERPL CALC-MCNC: 64 MG/DL
NONHDLC SERPL-MCNC: 122 MG/DL
PSA SERPL DL<=0.01 NG/ML-MCNC: 0.71 NG/ML (ref 0–4.5)
TRIGL SERPL-MCNC: 288 MG/DL

## 2024-04-12 PROCEDURE — 84403 ASSAY OF TOTAL TESTOSTERONE: CPT

## 2024-04-12 PROCEDURE — 83036 HEMOGLOBIN GLYCOSYLATED A1C: CPT

## 2024-04-12 PROCEDURE — 84450 TRANSFERASE (AST) (SGOT): CPT

## 2024-04-12 PROCEDURE — 36415 COLL VENOUS BLD VENIPUNCTURE: CPT

## 2024-04-12 PROCEDURE — 84460 ALANINE AMINO (ALT) (SGPT): CPT

## 2024-04-12 PROCEDURE — 80061 LIPID PANEL: CPT

## 2024-04-12 PROCEDURE — G0103 PSA SCREENING: HCPCS

## 2024-04-15 ENCOUNTER — ANCILLARY PROCEDURE (OUTPATIENT)
Dept: GENERAL RADIOLOGY | Facility: CLINIC | Age: 64
End: 2024-04-15
Attending: FAMILY MEDICINE
Payer: COMMERCIAL

## 2024-04-15 ENCOUNTER — OFFICE VISIT (OUTPATIENT)
Dept: FAMILY MEDICINE | Facility: CLINIC | Age: 64
End: 2024-04-15
Payer: COMMERCIAL

## 2024-04-15 VITALS
WEIGHT: 210.8 LBS | TEMPERATURE: 97.8 F | OXYGEN SATURATION: 95 % | HEART RATE: 70 BPM | RESPIRATION RATE: 16 BRPM | DIASTOLIC BLOOD PRESSURE: 74 MMHG | HEIGHT: 65 IN | BODY MASS INDEX: 35.12 KG/M2 | SYSTOLIC BLOOD PRESSURE: 138 MMHG

## 2024-04-15 DIAGNOSIS — R13.12 OROPHARYNGEAL DYSPHAGIA: ICD-10-CM

## 2024-04-15 DIAGNOSIS — J02.9 SORE THROAT: ICD-10-CM

## 2024-04-15 DIAGNOSIS — E88.810 METABOLIC SYNDROME: ICD-10-CM

## 2024-04-15 DIAGNOSIS — R06.2 WHEEZE: ICD-10-CM

## 2024-04-15 DIAGNOSIS — K76.0 FATTY LIVER: ICD-10-CM

## 2024-04-15 DIAGNOSIS — R68.2 DRY MOUTH: Primary | ICD-10-CM

## 2024-04-15 DIAGNOSIS — G20.B2 PARKINSON'S DISEASE WITH DYSKINESIA AND FLUCTUATING MANIFESTATIONS (H): ICD-10-CM

## 2024-04-15 LAB
BASOPHILS # BLD AUTO: 0 10E3/UL (ref 0–0.2)
BASOPHILS NFR BLD AUTO: 0 %
DEPRECATED S PYO AG THROAT QL EIA: NEGATIVE
EOSINOPHIL # BLD AUTO: 0.2 10E3/UL (ref 0–0.7)
EOSINOPHIL NFR BLD AUTO: 3 %
ERYTHROCYTE [DISTWIDTH] IN BLOOD BY AUTOMATED COUNT: 13.1 % (ref 10–15)
GROUP A STREP BY PCR: NOT DETECTED
HCT VFR BLD AUTO: 48.9 % (ref 40–53)
HGB BLD-MCNC: 16.3 G/DL (ref 13.3–17.7)
IMM GRANULOCYTES # BLD: 0 10E3/UL
IMM GRANULOCYTES NFR BLD: 0 %
LYMPHOCYTES # BLD AUTO: 2 10E3/UL (ref 0.8–5.3)
LYMPHOCYTES NFR BLD AUTO: 32 %
MCH RBC QN AUTO: 28.8 PG (ref 26.5–33)
MCHC RBC AUTO-ENTMCNC: 33.3 G/DL (ref 31.5–36.5)
MCV RBC AUTO: 87 FL (ref 78–100)
MONOCYTES # BLD AUTO: 0.5 10E3/UL (ref 0–1.3)
MONOCYTES NFR BLD AUTO: 9 %
NEUTROPHILS # BLD AUTO: 3.4 10E3/UL (ref 1.6–8.3)
NEUTROPHILS NFR BLD AUTO: 56 %
PLATELET # BLD AUTO: 204 10E3/UL (ref 150–450)
RBC # BLD AUTO: 5.65 10E6/UL (ref 4.4–5.9)
WBC # BLD AUTO: 6.1 10E3/UL (ref 4–11)

## 2024-04-15 PROCEDURE — 87651 STREP A DNA AMP PROBE: CPT | Performed by: FAMILY MEDICINE

## 2024-04-15 PROCEDURE — 85025 COMPLETE CBC W/AUTO DIFF WBC: CPT | Mod: QW | Performed by: FAMILY MEDICINE

## 2024-04-15 PROCEDURE — 99215 OFFICE O/P EST HI 40 MIN: CPT | Performed by: FAMILY MEDICINE

## 2024-04-15 PROCEDURE — 71046 X-RAY EXAM CHEST 2 VIEWS: CPT | Mod: TC | Performed by: RADIOLOGY

## 2024-04-15 ASSESSMENT — PATIENT HEALTH QUESTIONNAIRE - PHQ9
SUM OF ALL RESPONSES TO PHQ QUESTIONS 1-9: 4
SUM OF ALL RESPONSES TO PHQ QUESTIONS 1-9: 4
10. IF YOU CHECKED OFF ANY PROBLEMS, HOW DIFFICULT HAVE THESE PROBLEMS MADE IT FOR YOU TO DO YOUR WORK, TAKE CARE OF THINGS AT HOME, OR GET ALONG WITH OTHER PEOPLE: NOT DIFFICULT AT ALL

## 2024-04-15 ASSESSMENT — ASTHMA QUESTIONNAIRES
ACT_TOTALSCORE: 20
QUESTION_3 LAST FOUR WEEKS HOW OFTEN DID YOUR ASTHMA SYMPTOMS (WHEEZING, COUGHING, SHORTNESS OF BREATH, CHEST TIGHTNESS OR PAIN) WAKE YOU UP AT NIGHT OR EARLIER THAN USUAL IN THE MORNING: NOT AT ALL
QUESTION_4 LAST FOUR WEEKS HOW OFTEN HAVE YOU USED YOUR RESCUE INHALER OR NEBULIZER MEDICATION (SUCH AS ALBUTEROL): TWO OR THREE TIMES PER WEEK
QUESTION_2 LAST FOUR WEEKS HOW OFTEN HAVE YOU HAD SHORTNESS OF BREATH: ONCE OR TWICE A WEEK
QUESTION_1 LAST FOUR WEEKS HOW MUCH OF THE TIME DID YOUR ASTHMA KEEP YOU FROM GETTING AS MUCH DONE AT WORK, SCHOOL OR AT HOME: NONE OF THE TIME
QUESTION_5 LAST FOUR WEEKS HOW WOULD YOU RATE YOUR ASTHMA CONTROL: SOMEWHAT CONTROLLED
ACT_TOTALSCORE: 20

## 2024-04-15 NOTE — PROGRESS NOTES
Assessment & Plan   Problem List Items Addressed This Visit       Metabolic syndrome    Parkinson disease (H)    Fatty liver     Other Visit Diagnoses       Dry mouth    -  Primary    Relevant Orders    SSA Ro MAHENDRA Antibody IgG    SSB La MAHENDRA Antibody IgG    Anti Nuclear Petra IgG by IFA with Reflex    ESR: Erythrocyte sedimentation rate (Completed)    Sore throat        Relevant Orders    Streptococcus A Rapid Screen w/Reflex to PCR - Clinic Collect (Completed)    Group A Streptococcus PCR Throat Swab    Wheeze        Relevant Orders    General PFT Lab (Please always keep checked)    Pulmonary Function Test    CBC with Platelets & Differential (Completed)    ESR: Erythrocyte sedimentation rate (Completed)    XR Chest 2 Views                   Sore throat will check a rapid strep with reflex to PCR.  Low likelihood of this being related to strep and more suspicious that it is related to his severe dry mouth.      Dysphagia likely related to both his Parkinson disease and this new dry mouth     Dry mouth has been present for approximately 4 weeks.  Started about the same time that he developed wheezing.  Wheezing is worse with exercise and albuterol does not seem to improve the symptoms.  Will check some labs to see if this might be Sjogren syndrome will also get PFTs.  Chest x-ray is ordered.  He has not started any new medications recently.  Would like patient to return to clinic for follow-up with PCP when results are available.  If he is unable to get in with his PCP certainly welcome to follow-up with me also.    Parkinson disease with deep brain stimulator present.  Reviewing up-to-date does not indicate that dry mouth is likely to be secondary to the presence of the deep brain stimulator or due to the Parkinson disease.  He is going to follow-up with his neurologist.    Metabolic syndrome with fatty liver disease we will add on an AST to labs drawn earlier this week and also check CBC.  Will calculate fib 4  "based on these results.    Total time spent reviewing chart and preparing for appointment, with patient for appointment, and time spent charting and coordinating care on the day of the appointment in minutes was:  44      Addendum  FIB-4 Calculation: 3.72 at 4/15/2024  3:00 PM  Calculated from:  SGOT/AST: 57 U/L at 4/12/2024  7:03 AM  SGPT/ALT: 21 U/L at 4/12/2024  7:03 AM  Platelets: 204 10e3/uL at 4/15/2024  3:00 PM  Age: 63 years           BMI  Estimated body mass index is 34.76 kg/m  as calculated from the following:    Height as of this encounter: 1.659 m (5' 5.3\").    Weight as of this encounter: 95.6 kg (210 lb 12.8 oz).   Weight management plan: Discussed healthy diet and exercise guidelines          Subjective   Anand is a 63 year old, presenting for the following health issues:  Pharyngitis (Pt c/o not being able to get saliva, congestion in his sinus's. )        4/15/2024     1:52 PM   Additional Questions   Roomed by Rhonda     History of Present Illness       Reason for visit:  Sore throat  Symptom onset:  3-4 weeks ago  Symptoms include:  Dry mouth  Symptom intensity:  Severe  Symptom progression:  Staying the same  Had these symptoms before:  No  What makes it worse:  No  What makes it better:  Yes    He eats 2-3 servings of fruits and vegetables daily.He consumes 0 sweetened beverage(s) daily.He exercises with enough effort to increase his heart rate 20 to 29 minutes per day.  He exercises with enough effort to increase his heart rate 3 or less days per week.   He is taking medications regularly.                     Objective    /74 (BP Location: Right arm, Patient Position: Sitting)   Pulse 70   Temp 97.8  F (36.6  C) (Tympanic)   Resp 16   Ht 1.659 m (5' 5.3\")   Wt 95.6 kg (210 lb 12.8 oz)   SpO2 95%   BMI 34.76 kg/m    Body mass index is 34.76 kg/m .  Physical Exam               Signed Electronically by: Shena Pierre MD    "

## 2024-04-15 NOTE — PATIENT INSTRUCTIONS
Xylitol    Artificial saliva        Examples of salivary stimulants include:  ?Sugar-free hard candies or lozenges.  ?Sugar-free chewing gums containing various sweeteners such as aspartame, saccharin, and sorbitol, which can increase saliva production in patients with residual secretory capacity. Excessive sorbitol can lead to gastrointestinal side effects such as cramps and diarrhea [10].  ?Xylitol-containing gum or candy, which may also reduce the cariogenicity of the oral bacterial greg [5]. Xylitol can also cause diarrhea and flatulence if used in excess [15].  ?Topically applied oral inserts (eg, Xylimelt, Salese lozenges) that adhere to the mucosa [16,17].  ?Citrus-flavored sugarless tablets or oral drops, which may also contain malic acid. This acid, which is normally found in fruits such as apples or pears, stimulates salivary flow.  ?Dried fruit slices, such as peaches or nectarines, which stimulate salivary flow.

## 2024-04-16 ENCOUNTER — MYC MEDICAL ADVICE (OUTPATIENT)
Dept: FAMILY MEDICINE | Facility: CLINIC | Age: 64
End: 2024-04-16
Payer: COMMERCIAL

## 2024-04-16 LAB
AST SERPL W P-5'-P-CCNC: 57 U/L (ref 0–45)
TESTOST SERPL-MCNC: 486 NG/DL (ref 240–950)

## 2024-04-17 ENCOUNTER — TRANSFERRED RECORDS (OUTPATIENT)
Dept: HEALTH INFORMATION MANAGEMENT | Facility: CLINIC | Age: 64
End: 2024-04-17
Payer: COMMERCIAL

## 2024-04-21 SDOH — HEALTH STABILITY: PHYSICAL HEALTH: ON AVERAGE, HOW MANY MINUTES DO YOU ENGAGE IN EXERCISE AT THIS LEVEL?: 50 MIN

## 2024-04-21 SDOH — HEALTH STABILITY: PHYSICAL HEALTH: ON AVERAGE, HOW MANY DAYS PER WEEK DO YOU ENGAGE IN MODERATE TO STRENUOUS EXERCISE (LIKE A BRISK WALK)?: 3 DAYS

## 2024-04-21 ASSESSMENT — SOCIAL DETERMINANTS OF HEALTH (SDOH): HOW OFTEN DO YOU GET TOGETHER WITH FRIENDS OR RELATIVES?: ONCE A WEEK

## 2024-04-21 ASSESSMENT — PATIENT HEALTH QUESTIONNAIRE - PHQ9
SUM OF ALL RESPONSES TO PHQ QUESTIONS 1-9: 4
SUM OF ALL RESPONSES TO PHQ QUESTIONS 1-9: 4
10. IF YOU CHECKED OFF ANY PROBLEMS, HOW DIFFICULT HAVE THESE PROBLEMS MADE IT FOR YOU TO DO YOUR WORK, TAKE CARE OF THINGS AT HOME, OR GET ALONG WITH OTHER PEOPLE: SOMEWHAT DIFFICULT

## 2024-04-22 ENCOUNTER — OFFICE VISIT (OUTPATIENT)
Dept: FAMILY MEDICINE | Facility: CLINIC | Age: 64
End: 2024-04-22
Payer: COMMERCIAL

## 2024-04-22 VITALS
HEIGHT: 65 IN | BODY MASS INDEX: 33.99 KG/M2 | SYSTOLIC BLOOD PRESSURE: 173 MMHG | TEMPERATURE: 98.3 F | WEIGHT: 204 LBS | RESPIRATION RATE: 20 BRPM | HEART RATE: 64 BPM | DIASTOLIC BLOOD PRESSURE: 95 MMHG | OXYGEN SATURATION: 96 %

## 2024-04-22 DIAGNOSIS — E88.810 METABOLIC SYNDROME: ICD-10-CM

## 2024-04-22 DIAGNOSIS — F33.9 RECURRENT DEPRESSIVE DISORDER (H): ICD-10-CM

## 2024-04-22 DIAGNOSIS — R03.0 ELEVATED BLOOD PRESSURE READING WITHOUT DIAGNOSIS OF HYPERTENSION: ICD-10-CM

## 2024-04-22 DIAGNOSIS — R35.1 BENIGN PROSTATIC HYPERPLASIA WITH NOCTURIA: ICD-10-CM

## 2024-04-22 DIAGNOSIS — J30.2 SEASONAL ALLERGIC RHINITIS, UNSPECIFIED TRIGGER: ICD-10-CM

## 2024-04-22 DIAGNOSIS — R68.2 DRY MOUTH: ICD-10-CM

## 2024-04-22 DIAGNOSIS — R06.2 WHEEZING: ICD-10-CM

## 2024-04-22 DIAGNOSIS — Z96.89 S/P DEEP BRAIN STIMULATOR PLACEMENT: ICD-10-CM

## 2024-04-22 DIAGNOSIS — E66.09 CLASS 1 OBESITY DUE TO EXCESS CALORIES WITH SERIOUS COMORBIDITY AND BODY MASS INDEX (BMI) OF 33.0 TO 33.9 IN ADULT: ICD-10-CM

## 2024-04-22 DIAGNOSIS — E66.811 CLASS 1 OBESITY DUE TO EXCESS CALORIES WITH SERIOUS COMORBIDITY AND BODY MASS INDEX (BMI) OF 33.0 TO 33.9 IN ADULT: ICD-10-CM

## 2024-04-22 DIAGNOSIS — I35.0 AORTIC VALVE STENOSIS, ETIOLOGY OF CARDIAC VALVE DISEASE UNSPECIFIED: ICD-10-CM

## 2024-04-22 DIAGNOSIS — G20.B2 PARKINSON'S DISEASE WITH DYSKINESIA AND FLUCTUATING MANIFESTATIONS (H): ICD-10-CM

## 2024-04-22 DIAGNOSIS — Z00.00 ROUTINE GENERAL MEDICAL EXAMINATION AT A HEALTH CARE FACILITY: Primary | ICD-10-CM

## 2024-04-22 DIAGNOSIS — N40.1 BENIGN PROSTATIC HYPERPLASIA WITH NOCTURIA: ICD-10-CM

## 2024-04-22 DIAGNOSIS — K76.0 FATTY LIVER: ICD-10-CM

## 2024-04-22 DIAGNOSIS — E78.5 DYSLIPIDEMIA: ICD-10-CM

## 2024-04-22 DIAGNOSIS — K21.00 GASTROESOPHAGEAL REFLUX DISEASE WITH ESOPHAGITIS WITHOUT HEMORRHAGE: ICD-10-CM

## 2024-04-22 PROBLEM — R09.89 LABILE HYPERTENSION DUE TO CLINICAL ENVIRONMENT: Status: RESOLVED | Noted: 2022-04-07 | Resolved: 2024-04-22

## 2024-04-22 LAB
ALBUMIN UR-MCNC: NEGATIVE MG/DL
APPEARANCE UR: CLEAR
BILIRUB UR QL STRIP: NEGATIVE
COLOR UR AUTO: YELLOW
GLUCOSE UR STRIP-MCNC: NEGATIVE MG/DL
HGB UR QL STRIP: NEGATIVE
KETONES UR STRIP-MCNC: NEGATIVE MG/DL
LEUKOCYTE ESTERASE UR QL STRIP: NEGATIVE
NITRATE UR QL: NEGATIVE
PH UR STRIP: 5.5 [PH] (ref 5–7)
SP GR UR STRIP: 1.01 (ref 1–1.03)
UROBILINOGEN UR STRIP-ACNC: 0.2 E.U./DL

## 2024-04-22 PROCEDURE — 80048 BASIC METABOLIC PNL TOTAL CA: CPT | Performed by: INTERNAL MEDICINE

## 2024-04-22 PROCEDURE — 99396 PREV VISIT EST AGE 40-64: CPT | Performed by: INTERNAL MEDICINE

## 2024-04-22 PROCEDURE — 36415 COLL VENOUS BLD VENIPUNCTURE: CPT | Performed by: INTERNAL MEDICINE

## 2024-04-22 PROCEDURE — 81003 URINALYSIS AUTO W/O SCOPE: CPT | Mod: QW | Performed by: INTERNAL MEDICINE

## 2024-04-22 PROCEDURE — 99214 OFFICE O/P EST MOD 30 MIN: CPT | Mod: 25 | Performed by: INTERNAL MEDICINE

## 2024-04-22 RX ORDER — SIMVASTATIN 40 MG
TABLET ORAL
Qty: 90 TABLET | Refills: 3 | Status: CANCELLED | OUTPATIENT
Start: 2024-04-22

## 2024-04-22 NOTE — PATIENT INSTRUCTIONS
Check BP daily for 5 days. Get 3 readings each time. Forward the results to me   Preventive Care Advice   This is general advice given by our system to help you stay healthy. However, your care team may have specific advice just for you. Please talk to your care team about your preventive care needs.  Nutrition  Eat 5 or more servings of fruits and vegetables each day.  Try wheat bread, brown rice and whole grain pasta (instead of white bread, rice, and pasta).  Get enough calcium and vitamin D. Check the label on foods and aim for 100% of the RDA (recommended daily allowance).  Lifestyle  Exercise at least 150 minutes each week   (30 minutes a day, 5 days a week).  Do muscle strengthening activities 2 days a week. These help control your weight and prevent disease.  No smoking.  Wear sunscreen to prevent skin cancer.  Have a dental exam and cleaning every 6 months.  Yearly exams  See your health care team every year to talk about:  Any changes in your health.  Any medicines your care team has prescribed.  Preventive care, family planning, and ways to prevent chronic diseases.  Shots (vaccines)   HPV shots (up to age 26), if you've never had them before.  Hepatitis B shots (up to age 59), if you've never had them before.  COVID-19 shot: Get this shot when it's due.  Flu shot: Get a flu shot every year.  Tetanus shot: Get a tetanus shot every 10 years.  Pneumococcal, hepatitis A, and RSV shots: Ask your care team if you need these based on your risk.  Shingles shot (for age 50 and up).  General health tests  Diabetes screening:  Starting at age 35, Get screened for diabetes at least every 3 years.  If you are younger than age 35, ask your care team if you should be screened for diabetes.  Cholesterol test: At age 39, start having a cholesterol test every 5 years, or more often if advised.  Bone density scan (DEXA): At age 50, ask your care team if you should have this scan for osteoporosis (brittle bones).  Hepatitis  C: Get tested at least once in your life.  STIs (sexually transmitted infections)  Before age 24: Ask your care team if you should be screened for STIs.  After age 24: Get screened for STIs if you're at risk. You are at risk for STIs (including HIV) if:  You are sexually active with more than one person.  You don't use condoms every time.  You or a partner was diagnosed with a sexually transmitted infection.  If you are at risk for HIV, ask about PrEP medicine to prevent HIV.  Get tested for HIV at least once in your life, whether you are at risk for HIV or not.  Cancer screening tests  Cervical cancer screening: If you have a cervix, begin getting regular cervical cancer screening tests at age 21. Most people who have regular screenings with normal results can stop after age 65. Talk about this with your provider.  Breast cancer scan (mammogram): If you've ever had breasts, begin having regular mammograms starting at age 40. This is a scan to check for breast cancer.  Colon cancer screening: It is important to start screening for colon cancer at age 45.  Have a colonoscopy test every 10 years (or more often if you're at risk) Or, ask your provider about stool tests like a FIT test every year or Cologuard test every 3 years.  To learn more about your testing options, visit: https://www.[employer]/241749.pdf.  For help making a decision, visit: https://bit.ly/bw21264.  Prostate cancer screening test: If you have a prostate and are age 55 to 69, ask your provider if you would benefit from a yearly prostate cancer screening test.  Lung cancer screening: If you are a current or former smoker age 50 to 80, ask your care team if ongoing lung cancer screenings are right for you.  For informational purposes only. Not to replace the advice of your health care provider. Copyright   2023 [employer].. All rights reserved. Clinically reviewed by the [facility] Transitions Program. [employer] [id number] - REV  01/24.    Preventing Falls: Care Instructions  Injuries and health problems such as trouble walking or poor eyesight can increase your risk of falling. So can some medicines. But there are things you can do to help prevent falls. You can exercise to get stronger. You can also arrange your home to make it safer.    Talk to your doctor about the medicines you take. Ask if any of them increase the risk of falls and whether they can be changed or stopped.   Try to exercise regularly. It can help improve your strength and balance. This can help lower your risk of falling.     Practice fall safety and prevention.    Wear low-heeled shoes that fit well and give your feet good support. Talk to your doctor if you have foot problems that make this hard.  Carry a cellphone or wear a medical alert device that you can use to call for help.  Use stepladders instead of chairs to reach high objects. Don't climb if you're at risk for falls. Ask for help, if needed.  Wear the correct eyeglasses, if you need them.    Make your home safer.    Remove rugs, cords, clutter, and furniture from walkways.  Keep your house well lit. Use night-lights in hallways and bathrooms.  Install and use sturdy handrails on stairways.  Wear nonskid footwear, even inside. Don't walk barefoot or in socks without shoes.    Be safe outside.    Use handrails, curb cuts, and ramps whenever possible.  Keep your hands free by using a shoulder bag or backpack.  Try to walk in well-lit areas. Watch out for uneven ground, changes in pavement, and debris.  Be careful in the winter. Walk on the grass or gravel when sidewalks are slippery. Use de-icer on steps and walkways. Add non-slip devices to shoes.    Put grab bars and nonskid mats in your shower or tub and near the toilet. Try to use a shower chair or bath bench when bathing.   Get into a tub or shower by putting in your weaker leg first. Get out with your strong side first. Have a phone or medical alert device  "in the bathroom with you.   Where can you learn more?  Go to https://www.[employer].net/patiented  Enter G117 in the search box to learn more about \"Preventing Falls: Care Instructions.\"  Current as of: July 17, 2023               Content Version: 14.0    5224-1086 [employer].   Care instructions adapted under license by your healthcare professional. If you have questions about a medical condition or this instruction, always ask your healthcare professional. [employer] disclaims any warranty or liability for your use of this information.      Learning About Stress  What is stress?     Stress is your body's response to a hard situation. Your body can have a physical, emotional, or mental response. Stress is a fact of life for most people, and it affects everyone differently. What causes stress for you may not be stressful for someone else.  A lot of things can cause stress. You may feel stress when you go on a job interview, take a test, or run a race. This kind of short-term stress is normal and even useful. It can help you if you need to work hard or react quickly. For example, stress can help you finish an important job on time.  Long-term stress is caused by ongoing stressful situations or events. Examples of long-term stress include long-term health problems, ongoing problems at work, or conflicts in your family. Long-term stress can harm your health.  How does stress affect your health?  When you are stressed, your body responds as though you are in danger. It makes hormones that speed up your heart, make you breathe faster, and give you a burst of energy. This is called the fight-or-flight stress response. If the stress is over quickly, your body goes back to normal and no harm is done.  But if stress happens too often or lasts too long, it can have bad effects. Long-term stress can make you more likely to get sick, and it can make symptoms of some diseases worse. If you tense up " when you are stressed, you may develop neck, shoulder, or low back pain. Stress is linked to high blood pressure and heart disease.  Stress also harms your emotional health. It can make you goodrich, tense, or depressed. Your relationships may suffer, and you may not do well at work or school.  What can you do to manage stress?  You can try these things to help manage stress:   Do something active. Exercise or activity can help reduce stress. Walking is a great way to get started. Even everyday activities such as housecleaning or yard work can help.  Try yoga or luisa chi. These techniques combine exercise and meditation. You may need some training at first to learn them.  Do something you enjoy. For example, listen to music or go to a movie. Practice your hobby or do volunteer work.  Meditate. This can help you relax, because you are not worrying about what happened before or what may happen in the future.  Do guided imagery. Imagine yourself in any setting that helps you feel calm. You can use online videos, books, or a teacher to guide you.  Do breathing exercises. For example:  From a standing position, bend forward from the waist with your knees slightly bent. Let your arms dangle close to the floor.  Breathe in slowly and deeply as you return to a standing position. Roll up slowly and lift your head last.  Hold your breath for just a few seconds in the standing position.  Breathe out slowly and bend forward from the waist.  Let your feelings out. Talk, laugh, cry, and express anger when you need to. Talking with supportive friends or family, a counselor, or a butch leader about your feelings is a healthy way to relieve stress. Avoid discussing your feelings with people who make you feel worse.  Write. It may help to write about things that are bothering you. This helps you find out how much stress you feel and what is causing it. When you know this, you can find better ways to cope.  What can you do to prevent  "stress?  You might try some of these things to help prevent stress:  Manage your time. This helps you find time to do the things you want and need to do.  Get enough sleep. Your body recovers from the stresses of the day while you are sleeping.  Get support. Your family, friends, and community can make a difference in how you experience stress.  Limit your news feed. Avoid or limit time on social media or news that may make you feel stressed.  Do something active. Exercise or activity can help reduce stress. Walking is a great way to get started.  Where can you learn more?  Go to https://www.[employer].net/patiented  Enter N032 in the search box to learn more about \"Learning About Stress.\"  Current as of: October 24, 2023               Content Version: 14.0    5897-3682 [employer].   Care instructions adapted under license by your healthcare professional. If you have questions about a medical condition or this instruction, always ask your healthcare professional. [employer] disclaims any warranty or liability for your use of this information.      "

## 2024-04-22 NOTE — ASSESSMENT & PLAN NOTE
-Simvastatin 40 mg daily  -Triglycerides tend to be modestly elevated and patient is on a low carb diet

## 2024-04-22 NOTE — PROGRESS NOTES
Preventive Care Visit  [facility]  Charles Churchill MD, Internal Medicine  Apr 22, 2024      Assessment & Plan   Problem List Items Addressed This Visit       Allergic rhinitis    Benign prostatic hyperplasia with nocturia     -Urolift 10/15/21  -Chronic urinary frequency unchanged  -Urology F/U May 2024         Dyslipidemia     -Simvastatin 40 mg daily  -Triglycerides tend to be modestly elevated and patient is on a low carb diet         Gastroesophageal reflux disease     EGD 7/17/23 GERD on biopsy  Increase to BID PPI 10/26/23  Heartburn once or twice per month         Metabolic syndrome    Parkinson disease (H)     DBS 2018         S/P deep brain stimulator placement    Recurrent depressive disorder (H24)     Not a concern today.         Fatty liver     He had a work-up for fatty liver which including included viral serologies and HIV with a biopsy revealing fatty liver 1984         Class 1 obesity due to excess calories with serious comorbidity and body mass index (BMI) of 33.0 to 33.9 in adult     Had bariatrics consultation.         Aortic stenosis     Mild to moderate by echocardiogram in 2021         Relevant Orders    Echocardiogram Complete    Dry mouth     No new medications  Onset 4 weeks ago  CARROLL, SSA, SSB negative  Recent hemoglobin A1c 5.8 making controlled diabetes unlikely  History of negative HST 2018 and patient denies recent increased snoring           Relevant Orders    Basic metabolic panel  (Ca, Cl, CO2, Creat, Gluc, K, Na, BUN)    UA Macroscopic with reflex to Microscopic and Culture - Lab Collect    Wheezing     History of asthma as a child  Albuterol not helpful  Exertional  Recent chest x-ray and CBC normal  Concern for significant worsening of aortic stenosis as the cause  PFTs this week           Relevant Orders    Echocardiogram Complete    Elevated blood pressure reading without diagnosis of hypertension     Home blood pressure monitoring          Other Visit  Diagnoses       Routine general medical examination at a health care facility    -  Primary                      Counseling  Appropriate preventive services were discussed with this patient, including applicable screening as appropriate for fall prevention, nutrition, physical activity, Tobacco-use cessation, weight loss and cognition.  Checklist reviewing preventive services available has been given to the patient.  Reviewed patient's diet, addressing concerns and/or questions.   He is at risk for lack of exercise and has been provided with information to increase physical activity for the benefit of his well-being.   The patient was instructed to see the dentist every 6 months.   He is at risk for psychosocial distress and has been provided with information to reduce risk.           Kwame Michel is a 63 year old, presenting for the following:  Physical (Pt here for a Px and Follow-up on recent labwork) and Mouth Problem (Pt also here for Follow-up on dry mouth 4/16)        4/22/2024     1:00 PM   Additional Questions   Roomed by [facility]       Health Care Directive  Patient does not have a Health Care Directive or Living Will: Patient states has Advance Directive and will bring in a copy to clinic.      HEARING FREQUENCY    Right Ear:      1000 Hz RESPONSE- on Level: 40 db (Conditioning sound)   1000 Hz: RESPONSE- on Level: 40 db   2000 Hz: RESPONSE- on Level: 40 db   4000 Hz: RESPONSE- on Level: 40 db    Left Ear:      4000 Hz: RESPONSE- on Level: 40 db   2000 Hz: RESPONSE- on Level: 40 db   1000 Hz: RESPONSE- on Level: 40 db      Hearing Acuity: Pass    Hearing Assessment: normal         HPI  Patient here for wellness visit.  Had a good winter in [place].  He has been having a dry mouth for the past 4 weeks.  No new medications.  No dry eyes.  He urinates frequently small amounts and that has not changed.  No large volume of urination or excessive thirst.  He has noticed wheezing with physical activity over  the winter.  No chest pressure, PND, orthopnea, edema.  Childhood asthma.            4/21/2024   General Health   How would you rate your overall physical health? Good   Feel stress (tense, anxious, or unable to sleep) Only a little   (!) STRESS CONCERN      4/21/2024   Nutrition   Three or more servings of calcium each day? Yes   Diet: Regular (no restrictions)   How many servings of fruit and vegetables per day? (!) 2-3   How many sweetened beverages each day? 0-1         4/21/2024   Exercise   Days per week of moderate/strenous exercise 3 days   Average minutes spent exercising at this level 50 min         4/21/2024   Social Factors   Frequency of gathering with friends or relatives Once a week   Worry food won't last until get money to buy more No   Food not last or not have enough money for food? No   Do you have housing?  Yes   Are you worried about losing your housing? No   Lack of transportation? No   Unable to get utilities (heat,electricity)? No         4/21/2024   Fall Risk   Fallen 2 or more times in the past year? No   Trouble with walking or balance? Yes           4/21/2024   Dental   Dentist two times every year? (!) NO         4/21/2024   TB Screening   Were you born outside of the [place]? No       Today's PHQ-9 Score:       4/21/2024     8:07 PM   PHQ-9 SCORE   PHQ-9 Total Score MyChart 4 (Minimal depression)   PHQ-9 Total Score 4         4/21/2024   Substance Use   Alcohol more than 3/day or more than 7/wk No   Do you use any other substances recreationally? No     Social History     Tobacco Use    Smoking status: Never     Passive exposure: Never    Smokeless tobacco: Never   Vaping Use    Vaping status: Never Used   Substance Use Topics    Alcohol use: Yes    Drug use: Never           4/21/2024   STI Screening   New sexual partner(s) since last STI/HIV test? No   Last PSA:   Prostate Specific Antigen Screen   Date Value Ref Range Status   04/12/2024 0.71 0.00 - 4.50 ng/mL Final   05/20/2022 0.91 0.00  "- 4.00 ug/L Final     ASCVD Risk   The 10-year ASCVD risk score (Ros HARTMAN, et al., 2019) is: 18.8%    Values used to calculate the score:      Age: 63 years      Sex: Male      Is Non- : No      Diabetic: No      Tobacco smoker: No      Systolic Blood Pressure: 173 mmHg      Is BP treated: No      HDL Cholesterol: 25 mg/dL      Total Cholesterol: 147 mg/dL           Reviewed and updated as needed this visit by Provider                    Past Medical History:   Diagnosis Date    Hypertension     Uncomplicated asthma      Past Surgical History:   Procedure Laterality Date    BACK SURGERY      Decompression    COLONOSCOPY N/A 03/06/2013    right-sided diverticulosis    COLONOSCOPY W/ POLYPECTOMY  07/17/2023    3 small polyps-1 TA, ecchymosis, grade 1 internal hemorrhoids    CYSTOSCOPY, INSERTION, RETRACTION IMPLANT, PROSTATE, FOR PROSTATIC URETHRAL LIFT N/A 10/15/2021    EGD  07/17/2023    normal, GERD biopsy    ENT SURGERY      INSERTION, PULSE GENERATOR, DEEP BRAIN STIMULATOR, WITH MRI GUIDANCE Bilateral 2017    LIVER BIOPSY      ROTATOR CUFF REPAIR RT/LT  02/2012    ROTATOR CUFF REPAIR RT/LT  2009    vasectomy  1995         Review of Systems  Constitutional, neuro, ENT, endocrine, pulmonary, cardiac, gastrointestinal, genitourinary, musculoskeletal, integument and psychiatric systems are negative, except as otherwise noted.     Objective    Exam  BP (!) 173/95 (BP Location: Right arm, Patient Position: Sitting, Cuff Size: Adult Large)   Pulse 64   Temp 98.3  F (36.8  C) (Tympanic)   Resp 20   Ht 1.657 m (5' 5.25\")   Wt 92.5 kg (204 lb)   SpO2 96%   BMI 33.69 kg/m     Estimated body mass index is 33.69 kg/m  as calculated from the following:    Height as of this encounter: 1.657 m (5' 5.25\").    Weight as of this encounter: 92.5 kg (204 lb).    Physical Exam  Healthy-appearing man in no distress  HEENT exam mouth does appear dry, DBS  Neck is thick no adenopathy or " thyromegaly  Lungs clear  Cardiac exam regular with a 2/6 to 3/6 systolic murmur and a sash distribution, no edema, normal carotid and posterior tibial pulsations  Abdomen soft and nontender no paraspinal megaly  Skin warm and dry  Alert, oriented, speech fluent, cognition intact, parkinsonian tremor, no facial asymmetry, EOMI, normal strength, moves well  Normal mood and affect    Recent Results (from the past 720 hour(s))   Testosterone, total    Collection Time: 04/12/24  7:03 AM   Result Value Ref Range    Testosterone Total 486 240 - 950 ng/dL   Hemoglobin A1c    Collection Time: 04/12/24  7:03 AM   Result Value Ref Range    Hemoglobin A1C 5.8 (H) 0.0 - 5.6 %   Lipid panel reflex to direct LDL Fasting    Collection Time: 04/12/24  7:03 AM   Result Value Ref Range    Cholesterol 147 <200 mg/dL    Triglycerides 288 (H) <150 mg/dL    Direct Measure HDL 25 (L) >=40 mg/dL    LDL Cholesterol Calculated 64 <=100 mg/dL    Non HDL Cholesterol 122 <130 mg/dL    Patient Fasting > 8hrs? Yes    ALT    Collection Time: 04/12/24  7:03 AM   Result Value Ref Range    ALT 21 0 - 70 U/L   PSA, screen    Collection Time: 04/12/24  7:03 AM   Result Value Ref Range    Prostate Specific Antigen Screen 0.71 0.00 - 4.50 ng/mL   AST    Collection Time: 04/12/24  7:03 AM   Result Value Ref Range    AST 57 (H) 0 - 45 U/L   Streptococcus A Rapid Screen w/Reflex to PCR - Clinic Collect    Collection Time: 04/15/24  2:36 PM    Specimen: Throat; Swab   Result Value Ref Range    Group A Strep antigen Negative Negative   Group A Streptococcus PCR Throat Swab    Collection Time: 04/15/24  2:36 PM    Specimen: Throat; Swab   Result Value Ref Range    Group A strep by PCR Not Detected Not Detected   SSA Ro MAHENDRA Antibody IgG    Collection Time: 04/15/24  3:00 PM   Result Value Ref Range    SSA Petra IgG Instrument Value <0.5 <7.0 U/mL    SSA (Ro) Antibody IgG Negative Negative   SSB La MAHENDRA Antibody IgG    Collection Time: 04/15/24  3:00 PM   Result  Value Ref Range    SSB Petra IgG Instrument Value <0.6 <7.0 U/mL    SSB (La) Antibody IgG Negative Negative   Anti Nuclear Petra IgG by IFA with Reflex    Collection Time: 04/15/24  3:00 PM   Result Value Ref Range    CARROLL interpretation Negative Negative   ESR: Erythrocyte sedimentation rate    Collection Time: 04/15/24  3:00 PM   Result Value Ref Range    Erythrocyte Sedimentation Rate 4 0 - 20 mm/hr   CBC with platelets and differential    Collection Time: 04/15/24  3:00 PM   Result Value Ref Range    WBC Count 6.1 4.0 - 11.0 10e3/uL    RBC Count 5.65 4.40 - 5.90 10e6/uL    Hemoglobin 16.3 13.3 - 17.7 g/dL    Hematocrit 48.9 40.0 - 53.0 %    MCV 87 78 - 100 fL    MCH 28.8 26.5 - 33.0 pg    MCHC 33.3 31.5 - 36.5 g/dL    RDW 13.1 10.0 - 15.0 %    Platelet Count 204 150 - 450 10e3/uL    % Neutrophils 56 %    % Lymphocytes 32 %    % Monocytes 9 %    % Eosinophils 3 %    % Basophils 0 %    % Immature Granulocytes 0 %    Absolute Neutrophils 3.4 1.6 - 8.3 10e3/uL    Absolute Lymphocytes 2.0 0.8 - 5.3 10e3/uL    Absolute Monocytes 0.5 0.0 - 1.3 10e3/uL    Absolute Eosinophils 0.2 0.0 - 0.7 10e3/uL    Absolute Basophils 0.0 0.0 - 0.2 10e3/uL    Absolute Immature Granulocytes 0.0 <=0.4 10e3/uL     EXAM: XR CHEST 2 VIEWS  LOCATION: [facility]  DATE: 4/15/2024     INDICATION:  Wheeze  COMPARISON: None.                                                                      IMPRESSION:      Electronic stimulator is present in the left anterior chest wall with leads extending to the neck.     The cardiac silhouette is normal in size. Mediastinal borders are well-defined.     The lungs are symmetrically inflated. No interstitial or alveolar opacities.     Diaphragm curvature is normal. No pleural effusion.     Mild thoracic spondylosis.      Signed Electronically by: Charles Churchill MD    Answers submitted by the patient for this visit:  Patient Health Questionnaire (Submitted on 4/21/2024)  If you checked  off any problems, how difficult have these problems made it for you to do your work, take care of things at home, or get along with other people?: Somewhat difficult  PHQ9 TOTAL SCORE: 4

## 2024-04-22 NOTE — ASSESSMENT & PLAN NOTE
No new medications  Onset 4 weeks ago  CARROLL, SSA, SSB negative  Recent hemoglobin A1c 5.8 making controlled diabetes unlikely  History of negative HST 2018 and patient denies recent increased snoring

## 2024-04-22 NOTE — ASSESSMENT & PLAN NOTE
He had a work-up for fatty liver which including included viral serologies and HIV with a biopsy revealing fatty liver 1984

## 2024-04-22 NOTE — PROGRESS NOTES
Pt Echo order faxed to [facility] for scheduling [phone]  Jesse Fields CMA

## 2024-04-22 NOTE — ASSESSMENT & PLAN NOTE
History of asthma as a child  Albuterol not helpful  Exertional  Recent chest x-ray and CBC normal  Concern for significant worsening of aortic stenosis as the cause  PFTs this week

## 2024-04-23 LAB
ANION GAP SERPL CALCULATED.3IONS-SCNC: 18 MMOL/L (ref 7–15)
BUN SERPL-MCNC: 22.6 MG/DL (ref 8–23)
CALCIUM SERPL-MCNC: 9.9 MG/DL (ref 8.8–10.2)
CHLORIDE SERPL-SCNC: 101 MMOL/L (ref 98–107)
CREAT SERPL-MCNC: 1.25 MG/DL (ref 0.67–1.17)
DEPRECATED HCO3 PLAS-SCNC: 21 MMOL/L (ref 22–29)
EGFRCR SERPLBLD CKD-EPI 2021: 66 ML/MIN/1.73M2
GLUCOSE SERPL-MCNC: 110 MG/DL (ref 70–99)
POTASSIUM SERPL-SCNC: 4.7 MMOL/L (ref 3.4–5.3)
SODIUM SERPL-SCNC: 140 MMOL/L (ref 135–145)

## 2024-04-23 RX ORDER — ALBUTEROL SULFATE 0.83 MG/ML
2.5 SOLUTION RESPIRATORY (INHALATION) ONCE
Status: COMPLETED | OUTPATIENT
Start: 2024-04-24 | End: 2024-04-24

## 2024-04-24 ENCOUNTER — HOSPITAL ENCOUNTER (OUTPATIENT)
Dept: RESPIRATORY THERAPY | Facility: CLINIC | Age: 64
Discharge: HOME | End: 2024-04-24
Attending: FAMILY MEDICINE | Admitting: FAMILY MEDICINE
Payer: COMMERCIAL

## 2024-04-24 DIAGNOSIS — R06.2 WHEEZE: ICD-10-CM

## 2024-04-24 PROCEDURE — 999N000157 HC STATISTIC RCP TIME EA 10 MIN

## 2024-04-24 PROCEDURE — 250N000009 HC RX 250: Performed by: FAMILY MEDICINE

## 2024-04-24 PROCEDURE — 94060 EVALUATION OF WHEEZING: CPT

## 2024-04-24 RX ADMIN — ALBUTEROL SULFATE 2.5 MG: 2.5 SOLUTION RESPIRATORY (INHALATION) at 07:17

## 2024-04-24 NOTE — PROGRESS NOTES
RESPIRATORY CARE NOTE    Pre and Post spirometry with BD. Pulmonary Function Test completed by patient.  Good patient effort and cooperation. Albuterol 2.5 mg neb given for bronchodilation.  The results of this test meet the ATS standards for acceptability and repeatability. PT returned to baseline and left in no distress.    Teri Serrano, RT  4/24/2024

## 2024-04-25 ENCOUNTER — ANCILLARY PROCEDURE (OUTPATIENT)
Dept: CARDIOLOGY | Facility: CLINIC | Age: 64
End: 2024-04-25
Attending: INTERNAL MEDICINE
Payer: COMMERCIAL

## 2024-04-25 LAB — LVEF ECHO: NORMAL

## 2024-04-25 PROCEDURE — 93306 TTE W/DOPPLER COMPLETE: CPT | Performed by: INTERNAL MEDICINE

## 2024-04-28 LAB
EXPTIME-PRE: 6.43 SEC
FEF2575-%PRED-POST: 124 %
FEF2575-%PRED-PRE: 126 %
FEF2575-POST: 2.99 L/SEC
FEF2575-PRE: 3.05 L/SEC
FEF2575-PRED: 2.41 L/SEC
FEFMAX-%PRED-PRE: 124 %
FEFMAX-PRE: 9.96 L/SEC
FEFMAX-PRED: 8 L/SEC
FEV1-%PRED-PRE: 117 %
FEV1-PRE: 3.26 L
FEV1FEV6-PRE: 80 %
FEV1FEV6-PRED: 79 %
FEV1FVC-PRE: 80 %
FEV1FVC-PRED: 79 %
FIFMAX-PRE: 5.18 L/SEC
FVC-%PRED-PRE: 115 %
FVC-PRE: 4.06 L
FVC-PRED: 3.52 L

## 2024-05-01 ENCOUNTER — TRANSFERRED RECORDS (OUTPATIENT)
Dept: HEALTH INFORMATION MANAGEMENT | Facility: CLINIC | Age: 64
End: 2024-05-01
Payer: COMMERCIAL

## 2024-05-16 ENCOUNTER — TRANSFERRED RECORDS (OUTPATIENT)
Dept: HEALTH INFORMATION MANAGEMENT | Facility: CLINIC | Age: 64
End: 2024-05-16
Payer: COMMERCIAL

## 2024-06-01 DIAGNOSIS — E78.5 DYSLIPIDEMIA: ICD-10-CM

## 2024-06-03 ENCOUNTER — OFFICE VISIT (OUTPATIENT)
Dept: FAMILY MEDICINE | Facility: CLINIC | Age: 64
End: 2024-06-03
Attending: INTERNAL MEDICINE
Payer: COMMERCIAL

## 2024-06-03 VITALS
WEIGHT: 200 LBS | RESPIRATION RATE: 20 BRPM | DIASTOLIC BLOOD PRESSURE: 80 MMHG | OXYGEN SATURATION: 97 % | HEART RATE: 60 BPM | BODY MASS INDEX: 32.14 KG/M2 | HEIGHT: 66 IN | TEMPERATURE: 96.3 F | SYSTOLIC BLOOD PRESSURE: 142 MMHG

## 2024-06-03 DIAGNOSIS — I35.0 AORTIC VALVE STENOSIS, ETIOLOGY OF CARDIAC VALVE DISEASE UNSPECIFIED: ICD-10-CM

## 2024-06-03 DIAGNOSIS — G20.B2 PARKINSON'S DISEASE WITH DYSKINESIA AND FLUCTUATING MANIFESTATIONS (H): ICD-10-CM

## 2024-06-03 DIAGNOSIS — R06.2 WHEEZING: Primary | ICD-10-CM

## 2024-06-03 DIAGNOSIS — R03.0 ELEVATED BLOOD PRESSURE READING WITHOUT DIAGNOSIS OF HYPERTENSION: ICD-10-CM

## 2024-06-03 DIAGNOSIS — R35.1 BENIGN PROSTATIC HYPERPLASIA WITH NOCTURIA: ICD-10-CM

## 2024-06-03 DIAGNOSIS — R68.2 DRY MOUTH: ICD-10-CM

## 2024-06-03 DIAGNOSIS — N40.1 BENIGN PROSTATIC HYPERPLASIA WITH NOCTURIA: ICD-10-CM

## 2024-06-03 PROCEDURE — 99214 OFFICE O/P EST MOD 30 MIN: CPT | Performed by: INTERNAL MEDICINE

## 2024-06-03 RX ORDER — SIMVASTATIN 40 MG
TABLET ORAL
Qty: 90 TABLET | Refills: 2 | Status: SHIPPED | OUTPATIENT
Start: 2024-06-03

## 2024-06-03 RX ORDER — CALCIUM CARBONATE 260MG(650)
300 TABLET,CHEWABLE ORAL DAILY
COMMUNITY

## 2024-06-03 SDOH — HEALTH STABILITY: PHYSICAL HEALTH: ON AVERAGE, HOW MANY DAYS PER WEEK DO YOU ENGAGE IN MODERATE TO STRENUOUS EXERCISE (LIKE A BRISK WALK)?: 5 DAYS

## 2024-06-03 ASSESSMENT — SOCIAL DETERMINANTS OF HEALTH (SDOH): HOW OFTEN DO YOU GET TOGETHER WITH FRIENDS OR RELATIVES?: ONCE A WEEK

## 2024-06-03 NOTE — ASSESSMENT & PLAN NOTE
No new medications  Onset 3/24  Episodic perhaps 1-2 times per month and associated with nocturia/frequency  CARROLL, SSA, SSB negative  4/12/24 Hgb A1c 5.8  4/22/2024 BMP and UA unremarkable  History of negative HST 2018 and patient denies recent increased snoring

## 2024-06-03 NOTE — ASSESSMENT & PLAN NOTE
History of asthma as a child  Albuterol not helpful  Exertional  Recent chest x-ray and CBC normal  PFT normal 4/20/2024  Echo 4/25/2024 revealing moderate aortic stenosis, mild aortic insufficiency, otherwise unremarkable

## 2024-06-03 NOTE — PROGRESS NOTES
Pt swallow study order was faxed to the [facility] for scheduling per pt request [phone].  Jesse Fields CMA

## 2024-06-03 NOTE — ASSESSMENT & PLAN NOTE
-Urolift 10/15/21  -Chronic urinary frequency unchanged  -Urology F/U/16/24 with consideration of TURP vs. Prostate embolism

## 2024-06-03 NOTE — PROGRESS NOTES
Assessment & Plan   Problem List Items Addressed This Visit       Benign prostatic hyperplasia with nocturia     -Urolift 10/15/21  -Chronic urinary frequency unchanged  -Urology F/U/16/24 with consideration of TURP vs. Prostate embolism         Parkinson disease (H)    Relevant Orders    Speech Therapy  Referral    XR Video Swallow with SLP or OT    Aortic stenosis     4/25/2024 echocardiogram showing moderate aortic stenosis with mild aortic regurgitation         Dry mouth     No new medications  Onset 3/24  Episodic perhaps 1-2 times per month and associated with nocturia/frequency  CARROLL, SSA, SSB negative  4/12/24 Hgb A1c 5.8  4/22/2024 BMP and UA unremarkable  History of negative HST 2018 and patient denies recent increased snoring           Relevant Orders    Adult Pulmonary Medicine  Referral    Wheezing - Primary     History of asthma as a child  Albuterol not helpful  Exertional  Recent chest x-ray and CBC normal  PFT normal 4/20/2024  Echo 4/25/2024 revealing moderate aortic stenosis, mild aortic insufficiency, otherwise unremarkable           Relevant Orders    Adult Pulmonary Medicine  Referral    Elevated blood pressure reading without diagnosis of hypertension     Home blood pressure monitoring normal                    Counseling  Appropriate preventive services were discussed with this patient, including applicable screening as appropriate for fall prevention, nutrition, physical activity, Tobacco-use cessation, weight loss and cognition.  Checklist reviewing preventive services available has been given to the patient.  Reviewed patient's diet, addressing concerns and/or questions.   The patient was instructed to see the dentist every 6 months.   He is at risk for psychosocial distress and has been provided with information to reduce risk.  Dry mouth happens perhaps once or twice per month for several hours.          Subjective   Anand is a 63 year old, presenting for the  "following health issues:  Follow Up (Pt here for Follow-up on recent labwork and is reqeusting order for a swallowing study per his Neurologist)        6/3/2024     7:17 AM   Additional Questions   Roomed by Jesse ALFONSO   Patient here for follow-up.  He noted exertional wheezing with activity while he was in [place].  Has had no further problems.  He had asthma as a child.  No cough, dyspnea, chest pressure with activity.  Reports episodic dry mouth he has noted that it sometimes happens with urinary frequency and nocturia which are a chronic problem with him.  Recently saw the urologist and is contemplating having a TURP though he does not wish to rush into that.  Home blood pressure readings have been normal.  He is going to be having a battery replacement for his deep brain stimulator.  He saw his neurologist at [place] and a swallow study was recommended for episodes of difficulty swallowing requiring washing things down with liquids.  No heartburn, odynophagia, vomiting or weight loss.            Review of Systems  Constitutional, HEENT, cardiovascular, pulmonary, gi and gu systems are negative, except as otherwise noted.      Objective    BP (!) 142/80   Pulse 60   Temp (!) 96.3  F (35.7  C) (Tympanic)   Resp 20   Ht 1.664 m (5' 5.5\")   Wt 90.7 kg (200 lb)   SpO2 97%   BMI 32.78 kg/m    Body mass index is 32.78 kg/m .  Physical Exam   Healthy-appearing man in no distress  HEENT exam unremarkable  Neck supple adenopathy thyromegaly  Lungs clear no increased work of breathing  Cardiac exam regular with a 2/6 aortic stenosis murmur no edema  Alert, oriented, speech fluent, cognition intact, cranial nerves and strength normal, tremor left upper extremity  Normal mood and affect            Signed Electronically by: Charles Churchill MD    "

## 2024-07-30 ENCOUNTER — OFFICE VISIT (OUTPATIENT)
Dept: PULMONOLOGY | Facility: CLINIC | Age: 64
End: 2024-07-30
Payer: COMMERCIAL

## 2024-07-30 VITALS
OXYGEN SATURATION: 97 % | WEIGHT: 204.2 LBS | BODY MASS INDEX: 34.02 KG/M2 | HEART RATE: 66 BPM | SYSTOLIC BLOOD PRESSURE: 141 MMHG | DIASTOLIC BLOOD PRESSURE: 79 MMHG | HEIGHT: 65 IN

## 2024-07-30 DIAGNOSIS — R06.2 WHEEZING: ICD-10-CM

## 2024-07-30 DIAGNOSIS — R68.2 DRY MOUTH: ICD-10-CM

## 2024-07-30 PROCEDURE — G2211 COMPLEX E/M VISIT ADD ON: HCPCS | Performed by: INTERNAL MEDICINE

## 2024-07-30 PROCEDURE — 99205 OFFICE O/P NEW HI 60 MIN: CPT | Performed by: INTERNAL MEDICINE

## 2024-07-30 NOTE — PATIENT INSTRUCTIONS
"It was good to meet you in clinic today. This is what we discussed:    You may be developing recurrence of asthma affecting you during exercise.  Fortunately your lung function is normal.  Use the albuterol two inhalations through the holding chamber (\"spacer\") 30 minutes prior to exercise.  If you have allergic nasal or sinus symptoms, use a nasal steroid one spray in each nostril daily while you have symptoms.  You can also use a non-sedating antihistamine like Allegra (fexofenadine) or Zyrtec (cetirizine) while you have allergies.  Stay active with exercise.  I will see you in 3 months.  Contact me with questions or concerns.    Christiano Ferreira MD  Pulmonary and Critical Care Medicine  [facility]  Office [phone]    "

## 2024-07-30 NOTE — LETTER
7/30/2024      Rancho Moncada  [id number] [facility] [address]      Dear Colleague,    Thank you for referring your patient, Rancho Moncada, to the [facility]. Please see a copy of my visit note below.    Pulmonary Clinic Outpatient Consultation    Assessment and Plan:  64 year old male never smoker with a history of Parkinson disease s/p deep brain stimulator and on carbidopa-levodopa, HTN, childhood asthma, colon polyps, chronic rhinosinusitis, seasonal allergies, nasal septoplasty, BPH, dyslipidemia, GERD, depression, obesity, constipation, mild dysphagia, mild aortic insufficiency, moderate aortic stenosis, presenting for evaluation of exertional wheezing.    Exertional wheezing: Noted wheeze or whistling with exhalation when engaged in exercise. Generally does not limit exercise tolerance. Normal spirometry but does have an intermittent end-expiratory wheeze on exam, louder toward the upper airway. Unclear if this represents a recurrence of asthma in adulthood (had childhood wheezing when exposed [place] and [place], was on immunotherapy shots in childhood), inducible laryngeal obstruction, or other etiology. Seems less consistent with respiratory dyskinesia. Nothing concerning on CXR. Will start with more regular use of albuterol HFA two inhalations via holding chamber 30 minutes prior to exercise; if insufficient, can consider methacholine challenge or, more likely, just trial a low-dose ICS-LABA.    Plan:  - start using albuterol HFA two inhalations via holding chamber 30 minutes prior to exercise  - nasal steroid and non-sedating antihistamine as needed  - stay active with exercise  - recommend remaining up to date with respiratory vaccinations  - follow up in 3 months  - if insufficient symptom control, consider trial of low-dose ICS-LABA, consider possibility of inducible laryngeal obstruction or respiratory dyskinesia    Christiano Ferreira MD  [facility]  [facility]  Office [phone]  Pager [phone]  he/him    CCx: exertional wheezing    HPI: 64 year old male never smoker with a history of Parkinson disease s/p deep brain stimulator and on carbidopa-levodopa, HTN, childhood asthma, colon polyps, chronic rhinosinusitis, seasonal allergies, nasal septoplasty, BPH, dyslipidemia, GERD, depression, obesity, constipation, mild dysphagia, presenting for evaluation of exertional wheezing. Over the last year, has noted a wheeze or whistle with exhalation after getting into aerobic or resistance exercise, including hiking in [place] in the winter, weightlifting. Occasional dyspnea but generally the wheeze is a noise but does not feel short of breath or half to stop exercise. Has not used albuterol preemptively, uses it after the fact at which point it has been less helpful. No orthopnea or PND. No syncope or presyncope. [place] farm, had wheezing baling hay and was on immunotherapy shots, no inhalers. Has seasonal allergic rhinitis/conjunctivitis, takes nasal steroid and oral non-sedating antihistamine prn, occasionally with decongestant. [place] in [place]. Adopted, bio FHx unknown.    ROS:  A 12-system review was obtained and was negative with the exception of the symptoms endorsed in the history of present illness.    PMH:  never smoker with a history of Parkinson disease s/p deep brain stimulator and on carbidopa-levodopa, HTN, childhood asthma, colon polyps, chronic rhinosinusitis, seasonal allergies, nasal septoplasty, BPH, dyslipidemia, GERD, depression, obesity, constipation, mild dysphagia    PSH:  Past Surgical History:   Procedure Laterality Date     BACK SURGERY      Decompression     COLONOSCOPY N/A 03/06/2013    right-sided diverticulosis     COLONOSCOPY W/ POLYPECTOMY  07/17/2023    3 small polyps-1 TA, ecchymosis, grade 1 internal hemorrhoids     CYSTOSCOPY, INSERTION, RETRACTION IMPLANT, PROSTATE, FOR PROSTATIC URETHRAL LIFT N/A  10/15/2021     EGD  07/17/2023    normal, GERD biopsy     ENT SURGERY       INSERTION, PULSE GENERATOR, DEEP BRAIN STIMULATOR, WITH MRI GUIDANCE Bilateral 2017     LIVER BIOPSY       ROTATOR CUFF REPAIR RT/LT  02/2012     ROTATOR CUFF REPAIR RT/LT  2009     vasectomy  1995       Allergies:  Allergies   Allergen Reactions     Dust Mite Extract Itching     Sneezing, itching eyes  Sneezing, itching eyes       Mold      Other reaction(s): Sneezing..     Molds & Smuts Itching     Sneezing, itchy eyes  Sneezing, itchy eyes       Pollen Extract      Erythromycin Other (See Comments) and Rash     Other reaction(s): Erythema       Family HX:  Family History   Problem Relation Age of Onset     Unknown/Adopted Mother        Social Hx:  Social History     Socioeconomic History     Marital status:      Spouse name: Not on file     Number of children: Not on file     Years of education: Not on file     Highest education level: Not on file   Occupational History     Not on file   Tobacco Use     Smoking status: Never     Passive exposure: Never     Smokeless tobacco: Never   Vaping Use     Vaping status: Never Used   Substance and Sexual Activity     Alcohol use: Yes     Drug use: Never     Sexual activity: Yes     Partners: Female     Birth control/protection: Male Surgical   Other Topics Concern     Parent/sibling w/ CABG, MI or angioplasty before 65F 55M? No   Social History Narrative     Not on file     Social Determinants of Health     Financial Resource Strain: Low Risk  (6/3/2024)    Financial Resource Strain      Within the past 12 months, have you or your family members you live with been unable to get utilities (heat, electricity) when it was really needed?: No   Food Insecurity: Low Risk  (6/3/2024)    Food Insecurity      Within the past 12 months, did you worry that your food would run out before you got money to buy more?: No      Within the past 12 months, did the food you bought just not last and you didn t  have money to get more?: No   Transportation Needs: Low Risk  (6/3/2024)    Transportation Needs      Within the past 12 months, has lack of transportation kept you from medical appointments, getting your medicines, non-medical meetings or appointments, work, or from getting things that you need?: No   Physical Activity: Sufficiently Active (6/3/2024)    Exercise Vital Sign      Days of Exercise per Week: 5 days      Minutes of Exercise per Session: 50 min   Recent Concern: Physical Activity - Insufficiently Active (5/26/2024)    Received from [facility]    Exercise Vital Sign      Days of Exercise per Week: 4 days      Minutes of Exercise per Session: 30 min   Stress: No Stress Concern Present (6/3/2024)    [employer] [place] [employer] - Occupational Stress Questionnaire      Feeling of Stress : Only a little   Social Connections: Unknown (6/3/2024)    Social Connection and Isolation Panel [NHANES]      Frequency of Communication with Friends and Family: Not on file      Frequency of Social Gatherings with Friends and Family: Once a week      Attends Faith Services: Not on file      Active Member of Clubs or Organizations: Not on file      Attends Club or Organization Meetings: Not on file      Marital Status: Not on file   Interpersonal Safety: Not At Risk (5/11/2023)    Received from [facility], [facility]    Humiliation, Afraid, Rape, and Kick questionnaire      Fear of Current or Ex-Partner: No      Emotionally Abused: No      Physically Abused: No      Sexually Abused: No   Housing Stability: Low Risk  (6/3/2024)    Housing Stability      Do you have housing? : Yes      Are you worried about losing your housing?: No       Current Meds:  Current Outpatient Medications   Medication Sig Dispense Refill     albuterol (PROAIR HFA/PROVENTIL HFA/VENTOLIN HFA) 108 (90 Base) MCG/ACT inhaler INHALE 2 PUFFS FOUR TIMES DAILY AS NEEDED 18 g 1     docusate sodium (DSS) 100 MG capsule Take 100-200 mg by  "mouth as needed       fluticasone (FLONASE) 50 MCG/ACT nasal spray        Glycopyrronium Tosylate (QBREXZA) 2.4 % PADS Qbrexza 2.4 % towelette       Magnesium Citrate 100 MG TABS Take 300 mg by mouth daily       MAGNESIUM CITRATE PO Take 900 mg by mouth daily       omeprazole (PRILOSEC) 40 MG DR capsule Take 1 capsule (40 mg) by mouth 2 times daily 180 capsule 3     RYTARY 48. MG CPCR per CR capsule Take 2 capsules by mouth 5 times daily       simvastatin (ZOCOR) 40 MG tablet TAKE 1 TABLET BY MOUTH DAILY AT BEDTIME 90 tablet 2     tadalafil (CIALIS) 5 MG tablet Take 1 tablet (5 mg) by mouth daily as needed (sexual activity) 90 tablet 1       Physical Exam:  BP (!) 141/79 (BP Location: Left arm, Patient Position: Sitting, Cuff Size: Adult Large)   Pulse 66   Ht 1.651 m (5' 5\")   Wt 92.6 kg (204 lb 3.2 oz)   SpO2 97%   BMI 33.98 kg/m    Gen: alert, oriented, no distress  HEENT: nasal mucosa is unremarkable, no oropharyngeal lesions, no cervical or supraclavicular lymphadenopathy  CV: RRR, no M/G/R  Resp: end-expiratory wheeze, not with every breath, loudest over the neck but some transmission to lower airways  Skin: no apparent rashes  Ext: no cyanosis, clubbing or edema  Neuro: alert, nonfocal    Labs:  reviewed    Imaging:  CXR (April 2024):  - images directly reviewed, formal interpretation follows:  IMPRESSION:      Electronic stimulator is present in the left anterior chest wall with leads extending to the neck.     The cardiac silhouette is normal in size. Mediastinal borders are well-defined.     The lungs are symmetrically inflated. No interstitial or alveolar opacities.     Diaphragm curvature is normal. No pleural effusion.     Mild thoracic spondylosis.    TTE (April 2024):  The left ventricle is normal in size.  There is mild concentric left ventricular hypertrophy.  The visual ejection fraction is 60-65%.  No regional wall motion abnormalities noted.  Normal right ventricle size and systolic " function.  Moderate aortic valve calcification is present.  There is mild (1+) aortic regurgitation.  Moderate valvular aortic stenosis.  IVC diameter and respiratory changes fall into an intermediate range  suggesting an RA pressure of 8 mmHg.  There is no comparison study available.    Pulmonary Function Testing  Spirometry (April 2024)  Normal spirometry, no change with bronchodilator    Time spent on chart and image review, meeting with the patient to obtain history, perform physical exam, discuss test results, diagnostic possibilities, further testing options, treatment plan options, and care coordination: 67 minutes    The longitudinal plan of care for the diagnosis(es)/condition(s) as documented were addressed during this visit. Due to the added complexity in care, I will continue to support Anand in the subsequent management and with ongoing continuity of care.      Again, thank you for allowing me to participate in the care of your patient.        Sincerely,        Christiano Ferreira MD

## 2024-07-30 NOTE — PROGRESS NOTES
Pulmonary Clinic Outpatient Consultation    Assessment and Plan:  64 year old male never smoker with a history of Parkinson disease s/p deep brain stimulator and on carbidopa-levodopa, HTN, childhood asthma, colon polyps, chronic rhinosinusitis, seasonal allergies, nasal septoplasty, BPH, dyslipidemia, GERD, depression, obesity, constipation, mild dysphagia, mild aortic insufficiency, moderate aortic stenosis, presenting for evaluation of exertional wheezing.    Exertional wheezing: Noted wheeze or whistling with exhalation when engaged in exercise. Generally does not limit exercise tolerance. Normal spirometry but does have an intermittent end-expiratory wheeze on exam, louder toward the upper airway. Unclear if this represents a recurrence of asthma in adulthood (had childhood wheezing when exposed [place] and [place], was on immunotherapy shots in childhood), inducible laryngeal obstruction, or other etiology. Seems less consistent with respiratory dyskinesia. Nothing concerning on CXR. Will start with more regular use of albuterol HFA two inhalations via holding chamber 30 minutes prior to exercise; if insufficient, can consider methacholine challenge or, more likely, just trial a low-dose ICS-LABA.    Plan:  - start using albuterol HFA two inhalations via holding chamber 30 minutes prior to exercise  - nasal steroid and non-sedating antihistamine as needed  - stay active with exercise  - recommend remaining up to date with respiratory vaccinations  - follow up in 3 months  - if insufficient symptom control, consider trial of low-dose ICS-LABA, consider possibility of inducible laryngeal obstruction or respiratory dyskinesia    Christiano Ferreira MD  [facility]  Office [phone]  Pager [phone]  he/him    CCx: exertional wheezing    HPI: 64 year old male never smoker with a history of Parkinson disease s/p deep brain stimulator and on carbidopa-levodopa, HTN, childhood asthma,  colon polyps, chronic rhinosinusitis, seasonal allergies, nasal septoplasty, BPH, dyslipidemia, GERD, depression, obesity, constipation, mild dysphagia, presenting for evaluation of exertional wheezing. Over the last year, has noted a wheeze or whistle with exhalation after getting into aerobic or resistance exercise, including hiking in [place] in the winter, weightlifting. Occasional dyspnea but generally the wheeze is a noise but does not feel short of breath or half to stop exercise. Has not used albuterol preemptively, uses it after the fact at which point it has been less helpful. No orthopnea or PND. No syncope or presyncope. [place] a farm, had wheezing baling hay and was on immunotherapy shots, no inhalers. Has seasonal allergic rhinitis/conjunctivitis, takes nasal steroid and oral non-sedating antihistamine prn, occasionally with decongestant. [place] in [place]. Adopted, bio FHx unknown.    ROS:  A 12-system review was obtained and was negative with the exception of the symptoms endorsed in the history of present illness.    PMH:  never smoker with a history of Parkinson disease s/p deep brain stimulator and on carbidopa-levodopa, HTN, childhood asthma, colon polyps, chronic rhinosinusitis, seasonal allergies, nasal septoplasty, BPH, dyslipidemia, GERD, depression, obesity, constipation, mild dysphagia    PSH:  Past Surgical History:   Procedure Laterality Date    BACK SURGERY      Decompression    COLONOSCOPY N/A 03/06/2013    right-sided diverticulosis    COLONOSCOPY W/ POLYPECTOMY  07/17/2023    3 small polyps-1 TA, ecchymosis, grade 1 internal hemorrhoids    CYSTOSCOPY, INSERTION, RETRACTION IMPLANT, PROSTATE, FOR PROSTATIC URETHRAL LIFT N/A 10/15/2021    EGD  07/17/2023    normal, GERD biopsy    ENT SURGERY      INSERTION, PULSE GENERATOR, DEEP BRAIN STIMULATOR, WITH MRI GUIDANCE Bilateral 2017    LIVER BIOPSY      ROTATOR CUFF REPAIR RT/LT  02/2012    ROTATOR CUFF REPAIR RT/LT  2009    vasectomy   1995       Allergies:  Allergies   Allergen Reactions    Dust Mite Extract Itching     Sneezing, itching eyes  Sneezing, itching eyes      Mold      Other reaction(s): Sneezing..    Molds & Smuts Itching     Sneezing, itchy eyes  Sneezing, itchy eyes      Pollen Extract     Erythromycin Other (See Comments) and Rash     Other reaction(s): Erythema       Family HX:  Family History   Problem Relation Age of Onset    Unknown/Adopted Mother        Social Hx:  Social History     Socioeconomic History    Marital status:      Spouse name: Not on file    Number of children: Not on file    Years of education: Not on file    Highest education level: Not on file   Occupational History    Not on file   Tobacco Use    Smoking status: Never     Passive exposure: Never    Smokeless tobacco: Never   Vaping Use    Vaping status: Never Used   Substance and Sexual Activity    Alcohol use: Yes    Drug use: Never    Sexual activity: Yes     Partners: Female     Birth control/protection: Male Surgical   Other Topics Concern    Parent/sibling w/ CABG, MI or angioplasty before 65F 55M? No   Social History Narrative    Not on file     Social Determinants of Health     Financial Resource Strain: Low Risk  (6/3/2024)    Financial Resource Strain     Within the past 12 months, have you or your family members you live with been unable to get utilities (heat, electricity) when it was really needed?: No   Food Insecurity: Low Risk  (6/3/2024)    Food Insecurity     Within the past 12 months, did you worry that your food would run out before you got money to buy more?: No     Within the past 12 months, did the food you bought just not last and you didn t have money to get more?: No   Transportation Needs: Low Risk  (6/3/2024)    Transportation Needs     Within the past 12 months, has lack of transportation kept you from medical appointments, getting your medicines, non-medical meetings or appointments, work, or from getting things that you  need?: No   Physical Activity: Sufficiently Active (6/3/2024)    Exercise Vital Sign     Days of Exercise per Week: 5 days     Minutes of Exercise per Session: 50 min   Recent Concern: Physical Activity - Insufficiently Active (5/26/2024)    Received from [facility]    Exercise Vital Sign     Days of Exercise per Week: 4 days     Minutes of Exercise per Session: 30 min   Stress: No Stress Concern Present (6/3/2024)    [employer] - Occupational Stress Questionnaire     Feeling of Stress : Only a little   Social Connections: Unknown (6/3/2024)    Social Connection and Isolation Panel [NHANES]     Frequency of Communication with Friends and Family: Not on file     Frequency of Social Gatherings with Friends and Family: Once a week     Attends Cheondoism Services: Not on file     Active Member of Clubs or Organizations: Not on file     Attends Club or Organization Meetings: Not on file     Marital Status: Not on file   Interpersonal Safety: Not At Risk (5/11/2023)    Received from [facility], [facility]    Humiliation, Afraid, Rape, and Kick questionnaire     Fear of Current or Ex-Partner: No     Emotionally Abused: No     Physically Abused: No     Sexually Abused: No   Housing Stability: Low Risk  (6/3/2024)    Housing Stability     Do you have housing? : Yes     Are you worried about losing your housing?: No       Current Meds:  Current Outpatient Medications   Medication Sig Dispense Refill    albuterol (PROAIR HFA/PROVENTIL HFA/VENTOLIN HFA) 108 (90 Base) MCG/ACT inhaler INHALE 2 PUFFS FOUR TIMES DAILY AS NEEDED 18 g 1    docusate sodium (DSS) 100 MG capsule Take 100-200 mg by mouth as needed      fluticasone (FLONASE) 50 MCG/ACT nasal spray       Glycopyrronium Tosylate (QBREXZA) 2.4 % PADS Qbrexza 2.4 % towelette      Magnesium Citrate 100 MG TABS Take 300 mg by mouth daily      MAGNESIUM CITRATE PO Take 900 mg by mouth daily      omeprazole (PRILOSEC) 40 MG DR capsule Take 1  "capsule (40 mg) by mouth 2 times daily 180 capsule 3    RYTARY 48. MG CPCR per CR capsule Take 2 capsules by mouth 5 times daily      simvastatin (ZOCOR) 40 MG tablet TAKE 1 TABLET BY MOUTH DAILY AT BEDTIME 90 tablet 2    tadalafil (CIALIS) 5 MG tablet Take 1 tablet (5 mg) by mouth daily as needed (sexual activity) 90 tablet 1       Physical Exam:  BP (!) 141/79 (BP Location: Left arm, Patient Position: Sitting, Cuff Size: Adult Large)   Pulse 66   Ht 1.651 m (5' 5\")   Wt 92.6 kg (204 lb 3.2 oz)   SpO2 97%   BMI 33.98 kg/m    Gen: alert, oriented, no distress  HEENT: nasal mucosa is unremarkable, no oropharyngeal lesions, no cervical or supraclavicular lymphadenopathy  CV: RRR, no M/G/R  Resp: end-expiratory wheeze, not with every breath, loudest over the neck but some transmission to lower airways  Skin: no apparent rashes  Ext: no cyanosis, clubbing or edema  Neuro: alert, nonfocal    Labs:  reviewed    Imaging:  CXR (April 2024):  - images directly reviewed, formal interpretation follows:  IMPRESSION:      Electronic stimulator is present in the left anterior chest wall with leads extending to the neck.     The cardiac silhouette is normal in size. Mediastinal borders are well-defined.     The lungs are symmetrically inflated. No interstitial or alveolar opacities.     Diaphragm curvature is normal. No pleural effusion.     Mild thoracic spondylosis.    TTE (April 2024):  The left ventricle is normal in size.  There is mild concentric left ventricular hypertrophy.  The visual ejection fraction is 60-65%.  No regional wall motion abnormalities noted.  Normal right ventricle size and systolic function.  Moderate aortic valve calcification is present.  There is mild (1+) aortic regurgitation.  Moderate valvular aortic stenosis.  IVC diameter and respiratory changes fall into an intermediate range  suggesting an RA pressure of 8 mmHg.  There is no comparison study available.    Pulmonary Function " Testing  Spirometry (April 2024)  Normal spirometry, no change with bronchodilator    Time spent on chart and image review, meeting with the patient to obtain history, perform physical exam, discuss test results, diagnostic possibilities, further testing options, treatment plan options, and care coordination: 67 minutes    The longitudinal plan of care for the diagnosis(es)/condition(s) as documented were addressed during this visit. Due to the added complexity in care, I will continue to support Anand in the subsequent management and with ongoing continuity of care.

## 2024-08-26 ENCOUNTER — TRANSFERRED RECORDS (OUTPATIENT)
Dept: HEALTH INFORMATION MANAGEMENT | Facility: CLINIC | Age: 64
End: 2024-08-26
Payer: COMMERCIAL

## 2024-09-17 ENCOUNTER — TRANSFERRED RECORDS (OUTPATIENT)
Dept: HEALTH INFORMATION MANAGEMENT | Facility: CLINIC | Age: 64
End: 2024-09-17
Payer: COMMERCIAL

## 2024-09-28 ENCOUNTER — MYC REFILL (OUTPATIENT)
Dept: FAMILY MEDICINE | Facility: CLINIC | Age: 64
End: 2024-09-28
Payer: COMMERCIAL

## 2024-09-28 DIAGNOSIS — J30.2 SEASONAL ALLERGIC RHINITIS, UNSPECIFIED TRIGGER: Primary | ICD-10-CM

## 2024-09-30 ENCOUNTER — TRANSFERRED RECORDS (OUTPATIENT)
Dept: HEALTH INFORMATION MANAGEMENT | Facility: CLINIC | Age: 64
End: 2024-09-30
Payer: COMMERCIAL

## 2024-09-30 RX ORDER — FLUTICASONE PROPIONATE 50 MCG
2 SPRAY, SUSPENSION (ML) NASAL DAILY
Qty: 16 G | Refills: 11 | Status: SHIPPED | OUTPATIENT
Start: 2024-09-30

## 2025-03-24 ENCOUNTER — PATIENT OUTREACH (OUTPATIENT)
Dept: CARE COORDINATION | Facility: CLINIC | Age: 65
End: 2025-03-24
Payer: COMMERCIAL

## 2025-04-07 ENCOUNTER — PATIENT OUTREACH (OUTPATIENT)
Dept: CARE COORDINATION | Facility: CLINIC | Age: 65
End: 2025-04-07
Payer: COMMERCIAL

## 2025-04-09 SDOH — HEALTH STABILITY: PHYSICAL HEALTH: ON AVERAGE, HOW MANY MINUTES DO YOU ENGAGE IN EXERCISE AT THIS LEVEL?: 60 MIN

## 2025-04-09 SDOH — HEALTH STABILITY: PHYSICAL HEALTH: ON AVERAGE, HOW MANY DAYS PER WEEK DO YOU ENGAGE IN MODERATE TO STRENUOUS EXERCISE (LIKE A BRISK WALK)?: 5 DAYS

## 2025-04-09 ASSESSMENT — ASTHMA QUESTIONNAIRES
QUESTION_4 LAST FOUR WEEKS HOW OFTEN HAVE YOU USED YOUR RESCUE INHALER OR NEBULIZER MEDICATION (SUCH AS ALBUTEROL): TWO OR THREE TIMES PER WEEK
QUESTION_5 LAST FOUR WEEKS HOW WOULD YOU RATE YOUR ASTHMA CONTROL: WELL CONTROLLED
QUESTION_1 LAST FOUR WEEKS HOW MUCH OF THE TIME DID YOUR ASTHMA KEEP YOU FROM GETTING AS MUCH DONE AT WORK, SCHOOL OR AT HOME: NONE OF THE TIME
QUESTION_2 LAST FOUR WEEKS HOW OFTEN HAVE YOU HAD SHORTNESS OF BREATH: NOT AT ALL
ACT_TOTALSCORE: 22
QUESTION_3 LAST FOUR WEEKS HOW OFTEN DID YOUR ASTHMA SYMPTOMS (WHEEZING, COUGHING, SHORTNESS OF BREATH, CHEST TIGHTNESS OR PAIN) WAKE YOU UP AT NIGHT OR EARLIER THAN USUAL IN THE MORNING: NOT AT ALL

## 2025-04-09 ASSESSMENT — PATIENT HEALTH QUESTIONNAIRE - PHQ9
SUM OF ALL RESPONSES TO PHQ QUESTIONS 1-9: 7
SUM OF ALL RESPONSES TO PHQ QUESTIONS 1-9: 7
10. IF YOU CHECKED OFF ANY PROBLEMS, HOW DIFFICULT HAVE THESE PROBLEMS MADE IT FOR YOU TO DO YOUR WORK, TAKE CARE OF THINGS AT HOME, OR GET ALONG WITH OTHER PEOPLE: SOMEWHAT DIFFICULT

## 2025-04-09 ASSESSMENT — SOCIAL DETERMINANTS OF HEALTH (SDOH): HOW OFTEN DO YOU GET TOGETHER WITH FRIENDS OR RELATIVES?: ONCE A WEEK

## 2025-04-10 ENCOUNTER — OFFICE VISIT (OUTPATIENT)
Dept: FAMILY MEDICINE | Facility: CLINIC | Age: 65
End: 2025-04-10
Payer: COMMERCIAL

## 2025-04-10 VITALS
TEMPERATURE: 97.6 F | HEIGHT: 65 IN | WEIGHT: 199 LBS | BODY MASS INDEX: 33.15 KG/M2 | RESPIRATION RATE: 20 BRPM | OXYGEN SATURATION: 96 % | DIASTOLIC BLOOD PRESSURE: 84 MMHG | HEART RATE: 64 BPM | SYSTOLIC BLOOD PRESSURE: 150 MMHG

## 2025-04-10 DIAGNOSIS — G20.B2 PARKINSON'S DISEASE WITH DYSKINESIA AND FLUCTUATING MANIFESTATIONS (H): ICD-10-CM

## 2025-04-10 DIAGNOSIS — R06.2 WHEEZING: ICD-10-CM

## 2025-04-10 DIAGNOSIS — N40.1 BENIGN PROSTATIC HYPERPLASIA WITH NOCTURIA: ICD-10-CM

## 2025-04-10 DIAGNOSIS — Z00.00 ROUTINE GENERAL MEDICAL EXAMINATION AT A HEALTH CARE FACILITY: Primary | ICD-10-CM

## 2025-04-10 DIAGNOSIS — R68.2 DRY MOUTH: ICD-10-CM

## 2025-04-10 DIAGNOSIS — E78.5 DYSLIPIDEMIA: ICD-10-CM

## 2025-04-10 DIAGNOSIS — E88.810 METABOLIC SYNDROME: ICD-10-CM

## 2025-04-10 DIAGNOSIS — I35.0 AORTIC VALVE STENOSIS, ETIOLOGY OF CARDIAC VALVE DISEASE UNSPECIFIED: ICD-10-CM

## 2025-04-10 DIAGNOSIS — Z96.89 S/P DEEP BRAIN STIMULATOR PLACEMENT: ICD-10-CM

## 2025-04-10 DIAGNOSIS — N52.9 ERECTILE DYSFUNCTION, UNSPECIFIED ERECTILE DYSFUNCTION TYPE: ICD-10-CM

## 2025-04-10 DIAGNOSIS — R35.1 BENIGN PROSTATIC HYPERPLASIA WITH NOCTURIA: ICD-10-CM

## 2025-04-10 DIAGNOSIS — R10.13 RECURRENT EPIGASTRIC ABDOMINAL PAIN: ICD-10-CM

## 2025-04-10 RX ORDER — TADALAFIL 5 MG/1
5 TABLET ORAL DAILY PRN
Qty: 90 TABLET | Refills: 3 | Status: SHIPPED | OUTPATIENT
Start: 2025-04-10

## 2025-04-10 RX ORDER — LEVALBUTEROL TARTRATE 45 UG/1
1-2 AEROSOL, METERED ORAL
COMMUNITY
Start: 2024-12-14

## 2025-04-10 RX ORDER — SIMVASTATIN 40 MG
TABLET ORAL
Qty: 90 TABLET | Refills: 3 | Status: SHIPPED | OUTPATIENT
Start: 2025-04-10

## 2025-04-10 NOTE — PATIENT INSTRUCTIONS
Patient Education   Preventive Care Advice   This is general advice given by our system to help you stay healthy. However, your care team may have specific advice just for you. Please talk to your care team about your preventive care needs.  Nutrition  Eat 5 or more servings of fruits and vegetables each day.  Try wheat bread, brown rice and whole grain pasta (instead of white bread, rice, and pasta).  Get enough calcium and vitamin D. Check the label on foods and aim for 100% of the RDA (recommended daily allowance).  Lifestyle  Exercise at least 150 minutes each week  (30 minutes a day, 5 days a week).  Do muscle strengthening activities 2 days a week. These help control your weight and prevent disease.  No smoking.  Wear sunscreen to prevent skin cancer.  Have a dental exam and cleaning every 6 months.  Yearly exams  See your health care team every year to talk about:  Any changes in your health.  Any medicines your care team has prescribed.  Preventive care, family planning, and ways to prevent chronic diseases.  Shots (vaccines)   HPV shots (up to age 26), if you've never had them before.  Hepatitis B shots (up to age 59), if you've never had them before.  COVID-19 shot: Get this shot when it's due.  Flu shot: Get a flu shot every year.  Tetanus shot: Get a tetanus shot every 10 years.  Pneumococcal, hepatitis A, and RSV shots: Ask your care team if you need these based on your risk.  Shingles shot (for age 50 and up)  General health tests  Diabetes screening:  Starting at age 35, Get screened for diabetes at least every 3 years.  If you are younger than age 35, ask your care team if you should be screened for diabetes.  Cholesterol test: At age 39, start having a cholesterol test every 5 years, or more often if advised.  Bone density scan (DEXA): At age 50, ask your care team if you should have this scan for osteoporosis (brittle bones).  Hepatitis C: Get tested at least once in your life.  STIs (sexually  transmitted infections)  Before age 24: Ask your care team if you should be screened for STIs.  After age 24: Get screened for STIs if you're at risk. You are at risk for STIs (including HIV) if:  You are sexually active with more than one person.  You don't use condoms every time.  You or a partner was diagnosed with a sexually transmitted infection.  If you are at risk for HIV, ask about PrEP medicine to prevent HIV.  Get tested for HIV at least once in your life, whether you are at risk for HIV or not.  Cancer screening tests  Cervical cancer screening: If you have a cervix, begin getting regular cervical cancer screening tests starting at age 21.  Breast cancer scan (mammogram): If you've ever had breasts, begin having regular mammograms starting at age 40. This is a scan to check for breast cancer.  Colon cancer screening: It is important to start screening for colon cancer at age 45.  Have a colonoscopy test every 10 years (or more often if you're at risk) Or, ask your provider about stool tests like a FIT test every year or Cologuard test every 3 years.  To learn more about your testing options, visit:   .  For help making a decision, visit:   https://bit.ly/gx22103.  Prostate cancer screening test: If you have a prostate, ask your care team if a prostate cancer screening test (PSA) at age 55 is right for you.  Lung cancer screening: If you are a current or former smoker ages 50 to 80, ask your care team if ongoing lung cancer screenings are right for you.  For informational purposes only. Not to replace the advice of your health care provider. Copyright   2023 [facility]. All rights reserved. Clinically reviewed by the [facility]. Need Fixed 187675 - REV 01/24.  Preventing Falls: Care Instructions  Injuries and health problems such as trouble walking or poor eyesight can increase your risk of falling. So can some medicines. But there are things you can do to help  "prevent falls. You can exercise to get stronger. You can also arrange your home to make it safer.    Talk to your doctor about the medicines you take. Ask if any of them increase the risk of falls and whether they can be changed or stopped.   Try to exercise regularly. It can help improve your strength and balance. This can help lower your risk of falling.         Practice fall safety and prevention.   Wear low-heeled shoes that fit well and give your feet good support. Talk to your doctor if you have foot problems that make this hard.  Carry a cellphone or wear a medical alert device that you can use to call for help.  Use stepladders instead of chairs to reach high objects. Don't climb if you're at risk for falls. Ask for help, if needed.  Wear the correct eyeglasses, if you need them.        Make your home safer.   Remove rugs, cords, clutter, and furniture from walkways.  Keep your house well lit. Use night-lights in hallways and bathrooms.  Install and use sturdy handrails on stairways.  Wear nonskid footwear, even inside. Don't walk barefoot or in socks without shoes.        Be safe outside.   Use handrails, curb cuts, and ramps whenever possible.  Keep your hands free by using a shoulder bag or backpack.  Try to walk in well-lit areas. Watch out for uneven ground, changes in pavement, and debris.  Be careful in the winter. Walk on the grass or gravel when sidewalks are slippery. Use de-icer on steps and walkways. Add non-slip devices to shoes.    Put grab bars and nonskid mats in your shower or tub and near the toilet. Try to use a shower chair or bath bench when bathing.   Get into a tub or shower by putting in your weaker leg first. Get out with your strong side first. Have a phone or medical alert device in the bathroom with you.   Where can you learn more?  Go to https://www.[employer].net/patiented  Enter G117 in the search box to learn more about \"Preventing Falls: Care Instructions.\"  Current as of: " July 31, 2024  Content Version: 14.4    3760-2697 [employer].   Care instructions adapted under license by your healthcare professional. If you have questions about a medical condition or this instruction, always ask your healthcare professional. [employer] disclaims any warranty or liability for your use of this information.    Learning About Stress  What is stress?     Stress is your body's response to a hard situation. Your body can have a physical, emotional, or mental response. Stress is a fact of life for most people, and it affects everyone differently. What causes stress for you may not be stressful for someone else.  A lot of things can cause stress. You may feel stress when you go on a job interview, take a test, or run a race. This kind of short-term stress is normal and even useful. It can help you if you need to work hard or react quickly. For example, stress can help you finish an important job on time.  Long-term stress is caused by ongoing stressful situations or events. Examples of long-term stress include long-term health problems, ongoing problems at work, or conflicts in your family. Long-term stress can harm your health.  How does stress affect your health?  When you are stressed, your body responds as though you are in danger. It makes hormones that speed up your heart, make you breathe faster, and give you a burst of energy. This is called the fight-or-flight stress response. If the stress is over quickly, your body goes back to normal and no harm is done.  But if stress happens too often or lasts too long, it can have bad effects. Long-term stress can make you more likely to get sick, and it can make symptoms of some diseases worse. If you tense up when you are stressed, you may develop neck, shoulder, or low back pain. Stress is linked to high blood pressure and heart disease.  Stress also harms your emotional health. It can make you goodrich, tense, or depressed. Your  relationships may suffer, and you may not do well at work or school.  What can you do to manage stress?  You can try these things to help manage stress:   Do something active. Exercise or activity can help reduce stress. Walking is a great way to get started. Even everyday activities such as housecleaning or yard work can help.  Try yoga or luisa chi. These techniques combine exercise and meditation. You may need some training at first to learn them.  Do something you enjoy. For example, listen to music or go to a movie. Practice your hobby or do volunteer work.  Meditate. This can help you relax, because you are not worrying about what happened before or what may happen in the future.  Do guided imagery. Imagine yourself in any setting that helps you feel calm. You can use online videos, books, or a teacher to guide you.  Do breathing exercises. For example:  From a standing position, bend forward from the waist with your knees slightly bent. Let your arms dangle close to the floor.  Breathe in slowly and deeply as you return to a standing position. Roll up slowly and lift your head last.  Hold your breath for just a few seconds in the standing position.  Breathe out slowly and bend forward from the waist.  Let your feelings out. Talk, laugh, cry, and express anger when you need to. Talking with supportive friends or family, a counselor, or a butch leader about your feelings is a healthy way to relieve stress. Avoid discussing your feelings with people who make you feel worse.  Write. It may help to write about things that are bothering you. This helps you find out how much stress you feel and what is causing it. When you know this, you can find better ways to cope.  What can you do to prevent stress?  You might try some of these things to help prevent stress:  Manage your time. This helps you find time to do the things you want and need to do.  Get enough sleep. Your body recovers from the stresses of the day while  "you are sleeping.  Get support. Your family, friends, and community can make a difference in how you experience stress.  Limit your news feed. Avoid or limit time on social media or news that may make you feel stressed.  Do something active. Exercise or activity can help reduce stress. Walking is a great way to get started.  Where can you learn more?  Go to https://www.[employer].net/patiented  Enter N032 in the search box to learn more about \"Learning About Stress.\"  Current as of: October 24, 2024  Content Version: 14.4    9818-3513 [employer].   Care instructions adapted under license by your healthcare professional. If you have questions about a medical condition or this instruction, always ask your healthcare professional. [employer] disclaims any warranty or liability for your use of this information.    Learning About Depression Screening  What is depression screening?  Depression screening is a way to see if you have depression symptoms. It may be done by a doctor or counselor. It's often part of a routine checkup. That's because your mental health is just as important as your physical health.  Depression is a mental health condition that affects how you feel, think, and act. You may:  Have less energy.  Lose interest in your daily activities.  Feel sad and grouchy for a long time.  Depression is very common. It affects people of all ages.  Many things can lead to depression. Some people become depressed after they have a stroke or find out they have a major illness like cancer or heart disease. The death of a loved one or a breakup may lead to depression. It can run in families. Most experts believe that a combination of inherited genes and stressful life events can cause it.  What happens during screening?  You may be asked to fill out a form about your depression symptoms. You and the doctor will discuss your answers. The doctor may ask you more questions to learn more about how " "you think, act, and feel.  What happens after screening?  If you have symptoms of depression, your doctor will talk to you about your options.  Doctors usually treat depression with medicines or counseling. Often, combining the two works best. Many people don't get help because they think that they'll get over the depression on their own. But people with depression may not get better unless they get treatment.  The cause of depression is not well understood. There may be many factors involved. But if you have depression, it's not your fault.  A serious symptom of depression is thinking about death or suicide. If you or someone you care about talks about this or about feeling hopeless, get help right away.  It's important to know that depression can be treated. Medicine, counseling, and self-care may help.  Where can you learn more?  Go to https://www.[employer].net/patiented  Enter T185 in the search box to learn more about \"Learning About Depression Screening.\"  Current as of: July 31, 2024  Content Version: 14.4    5891-8778 [employer].   Care instructions adapted under license by your healthcare professional. If you have questions about a medical condition or this instruction, always ask your healthcare professional. [employer] disclaims any warranty or liability for your use of this information.       "

## 2025-04-10 NOTE — PROGRESS NOTES
Preventive Care Visit  [facility]  Charles Churchill MD, Internal Medicine  Apr 10, 2025      Assessment & Plan   Problem List Items Addressed This Visit       Erectile dysfunction    Relevant Medications    tadalafil (CIALIS) 5 MG tablet    Other Relevant Orders    Testosterone Free and Total    Benign prostatic hyperplasia with nocturia     4/10/25  Had an episode of urinary retention requiring catheterization in the ER over the winter in [place] but has been asymptomatic since then    6/3/2024  -Urolift 10/15/21  -Chronic urinary frequency unchanged  -Urology F/U/16/24 with consideration of TURP vs. Prostate embolism         Dyslipidemia     -Simvastatin 40 mg daily  -Triglycerides tend to be modestly elevated and patient is on a low carb diet         Relevant Medications    simvastatin (ZOCOR) 40 MG tablet    Other Relevant Orders    Lipid panel reflex to direct LDL Non-fasting    Metabolic syndrome    Relevant Orders    Testosterone Free and Total    Parkinson disease (H)     DBS 2018         S/P deep brain stimulator placement    Aortic stenosis     4/25/2024 echocardiogram showing moderate aortic stenosis with mild aortic regurgitation  Cardiology consulted last year  Echo next year         Dry mouth     4/10/25  Continues to be bothersome on a very intermittent basis  Can be associated with Sinemet I suggested discussing this with his neurologist    6/3/2024  No new medications  Onset 3/24  Episodic perhaps 1-2 times per month and associated with nocturia/frequency  CARROLL, SSA, SSB negative  4/12/24 Hgb A1c 5.8  4/22/2024 BMP and UA unremarkable  History of negative HST 2018 and patient denies recent increased snoring           Relevant Orders    Basic metabolic panel  (Ca, Cl, CO2, Creat, Gluc, K, Na, BUN)    Wheezing     4/10/25  Rare use of albuterol    6/3/24  History of asthma as a child  Albuterol not helpful  Exertional  Recent chest x-ray and CBC normal  PFT normal  "4/20/2024  Echo 4/25/2024 revealing moderate aortic stenosis, mild aortic insufficiency, otherwise unremarkable           Relevant Medications    levalbuterol (XOPENEX HFA) 45 MCG/ACT inhaler    Recurrent epigastric abdominal pain     4/10/2025  Recurrent epigastric pain  Negative CT scan in 2022  Normal EGD in 2023  Lab upper quadrant ultrasound ordered         Relevant Orders    US Abdomen Limited    Hepatic panel (Albumin, ALT, AST, Bili, Alk Phos, TP)    CBC with platelets     Other Visit Diagnoses       Routine general medical examination at a health care facility    -  Primary                    BMI  Estimated body mass index is 33.12 kg/m  as calculated from the following:    Height as of this encounter: 1.651 m (5' 5\").    Weight as of this encounter: 90.3 kg (199 lb).   Weight management plan: Discussed healthy diet and exercise guidelines    Counseling  Appropriate preventive services were addressed with this patient via screening, questionnaire, or discussion as appropriate for fall prevention, nutrition, physical activity, Tobacco-use cessation, social engagement, weight loss and cognition.  Checklist reviewing preventive services available has been given to the patient.  Reviewed patient's diet, addressing concerns and/or questions.   The patient was instructed to see the dentist every 6 months.   He is at risk for psychosocial distress and has been provided with information to reduce risk.   The patient's PHQ-9 score is consistent with mild depression. He was provided with information regarding depression.           Kwame Michel is a 64 year old, presenting for the following:  Physical (Pt here for Px. Pt is not fasting today.  Pt also c/o dry mouth x year)      HEARING FREQUENCY    Right Ear:      1000 Hz RESPONSE- on Level: 40 db (Conditioning sound)   1000 Hz: RESPONSE- on Level: 40 db   2000 Hz: RESPONSE- on Level: 40 db   4000 Hz: RESPONSE- on Level: 40 db    Left Ear:      4000 Hz: RESPONSE- " on Level: tone not heard   2000 Hz: RESPONSE- on Level: tone not heard   1000 Hz: RESPONSE- on Level: 40 db      Hearing Acuity: REFER    Hearing Assessment: abnormal-- Pt declines any further eval at this time                4/10/2025     2:09 PM   Additional Questions   Roomed by Jesse BRISCOE         HPI    Patient returns from [place] for a wellness visit.  He had an episode of urinary retention requiring catheterization while in [place] but has had no recurrence since then.  Weight and has been exercising regularly.  He saw cardiology last year about his aortic stenosis and has no symptoms.  No falls.  Uses albuterol occasionally effect.  He complains of episodes of epigastric discomfort over the last month lasting about 2 minutes each.  They are nonexertional.  No cough, dyspnea, chest pain, nausea, vomiting or other associated symptoms.     Hypertension Follow-up    Do you check your blood pressure regularly outside of the clinic? Yes   Are you following a low salt diet? Yes  Are your blood pressures ever more than 140 on the top number (systolic) OR more   than 90 on the bottom number (diastolic), for example 140/90? No    BP Readings from Last 2 Encounters:   04/10/25 (!) 150/84   07/30/24 (!) 141/79     Depression   How are you doing with your depression since your last visit? No change  Are you having other symptoms that might be associated with depression? No  Have you had a significant life event?  Health Concerns   Are you feeling anxious or having panic attacks?   No  Do you have any concerns with your use of alcohol or other drugs? No    Social History     Tobacco Use    Smoking status: Never     Passive exposure: Never    Smokeless tobacco: Never   Vaping Use    Vaping status: Never Used   Substance Use Topics    Alcohol use: Yes    Drug use: Never         4/15/2024     1:44 PM 4/21/2024     8:07 PM 4/9/2025     3:33 PM   PHQ   PHQ-9 Total Score 4 4 7    Q9: Thoughts of better off dead/self-harm past 2  weeks Not at all Not at all Not at all       Patient-reported          No data to display                  4/9/2025     3:33 PM   Last PHQ-9   1.  Little interest or pleasure in doing things 1   2.  Feeling down, depressed, or hopeless 0   3.  Trouble falling or staying asleep, or sleeping too much 2   4.  Feeling tired or having little energy 1   5.  Poor appetite or overeating 1   6.  Feeling bad about yourself 0   7.  Trouble concentrating 1   8.  Moving slowly or restless 1   Q9: Thoughts of better off dead/self-harm past 2 weeks 0   PHQ-9 Total Score 7        Patient-reported       Suicide Assessment Five-step Evaluation and Treatment (SAFE-T)    Asthma      4/9/2025     3:40 PM   ACT Total Scores   ACT TOTAL SCORE (Goal Greater than or Equal to 20) 22    In the past 12 months, how many times did you visit the emergency room for your asthma without being admitted to the hospital? 0   In the past 12 months, how many times were you hospitalized overnight because of your asthma? 0       Patient-reported     Do you have any of the following symptoms? None of these symptoms (cough/noisy breathing/trouble with breathing)  What makes your asthma/breathing worse?  Exercise or sports  Do you want more information about how to use your inhaler? No    Advance Care Planning  Patient does not have a Health Care Directive: Patient states has Advance Directive and will bring in a copy to clinic.      4/9/2025   General Health   How would you rate your overall physical health? Good   Feel stress (tense, anxious, or unable to sleep) To some extent   (!) STRESS CONCERN      4/9/2025   Nutrition   Three or more servings of calcium each day? (!) NO   Diet: Regular (no restrictions)   How many servings of fruit and vegetables per day? (!) 2-3   How many sweetened beverages each day? 0-1         4/9/2025   Exercise   Days per week of moderate/strenous exercise 5 days   Average minutes spent exercising at this level 60 min          4/9/2025   Social Factors   Frequency of gathering with friends or relatives Once a week   Worry food won't last until get money to buy more No   Food not last or not have enough money for food? No   Do you have housing? (Housing is defined as stable permanent housing and does not include staying ouside in a car, in a tent, in an abandoned building, in an overnight shelter, or couch-surfing.) Yes   Are you worried about losing your housing? No   Lack of transportation? No   Unable to get utilities (heat,electricity)? No         4/9/2025   Fall Risk   Fallen 2 or more times in the past year? No   Trouble with walking or balance? Yes             4/9/2025   Dental   Dentist two times every year? (!) NO           4/21/2024   TB Screening   Were you born outside of the [place]? No         Today's PHQ-9 Score:       4/9/2025     3:33 PM   PHQ-9 SCORE   PHQ-9 Total Score MyChart 7 (Mild depression)   PHQ-9 Total Score 7        Patient-reported         4/9/2025   Substance Use   Alcohol more than 3/day or more than 7/wk Not Applicable   Do you use any other substances recreationally? No     Social History     Tobacco Use    Smoking status: Never     Passive exposure: Never    Smokeless tobacco: Never   Vaping Use    Vaping status: Never Used   Substance Use Topics    Alcohol use: Yes    Drug use: Never           4/9/2025   STI Screening   New sexual partner(s) since last STI/HIV test? No   Last PSA:   Prostate Specific Antigen Screen   Date Value Ref Range Status   04/12/2024 0.71 0.00 - 4.50 ng/mL Final   05/20/2022 0.91 0.00 - 4.00 ug/L Final     ASCVD Risk   The 10-year ASCVD risk score (Ros HARTMAN, et al., 2019) is: 16%    Values used to calculate the score:      Age: 64 years      Sex: Male      Is Non- : No      Diabetic: No      Tobacco smoker: No      Systolic Blood Pressure: 150 mmHg      Is BP treated: No      HDL Cholesterol: 25 mg/dL      Total Cholesterol: 147 mg/dL           Reviewed  "and updated as needed this visit by Provider                    Past Surgical History:   Procedure Laterality Date    BACK SURGERY      Decompression    COLONOSCOPY N/A 03/06/2013    right-sided diverticulosis    COLONOSCOPY W/ POLYPECTOMY  07/17/2023    3 small polyps-1 TA, ecchymosis, grade 1 internal hemorrhoids    CYSTOSCOPY, INSERTION, RETRACTION IMPLANT, PROSTATE, FOR PROSTATIC URETHRAL LIFT N/A 10/15/2021    EGD  07/17/2023    normal, GERD biopsy    ENT SURGERY      INSERTION, PULSE GENERATOR, DEEP BRAIN STIMULATOR, WITH MRI GUIDANCE Bilateral 2017    LIVER BIOPSY      ROTATOR CUFF REPAIR RT/LT  02/2012    ROTATOR CUFF REPAIR RT/LT  2009    vasectomy  1995         Review of Systems  Constitutional, HEENT, cardiovascular, pulmonary, GI, , musculoskeletal, neuro, skin, endocrine and psych systems are negative, except as otherwise noted.     Objective    Exam  BP (!) 150/84 (BP Location: Right arm, Patient Position: Sitting, Cuff Size: Adult Large)   Pulse 64   Temp 97.6  F (36.4  C) (Tympanic)   Resp 20   Ht 1.651 m (5' 5\")   Wt 90.3 kg (199 lb)   SpO2 96%   BMI 33.12 kg/m     Estimated body mass index is 33.12 kg/m  as calculated from the following:    Height as of this encounter: 1.651 m (5' 5\").    Weight as of this encounter: 90.3 kg (199 lb).    Physical Exam  Patient appears comfortable and in no distress  Eyes normal  HEENT exam unremarkable  Neck thick no adenopathy or thyromegaly  Lungs clear, no gastric breathing  Cardiac exam regular, no murmur, no edema  Abdomen soft, nondistended, nontender  Alert, oriented, speech and cognition intact, cranials normal, tremor left greater than right, normal strength and gait  Mood and affect normal        Signed Electronically by: Charles Churchill MD    Answers submitted by the patient for this visit:  Patient Health Questionnaire (Submitted on 4/9/2025)  If you checked off any problems, how difficult have these problems made it for you to do your work, " take care of things at home, or get along with other people?: Somewhat difficult  PHQ9 TOTAL SCORE: 7

## 2025-04-10 NOTE — ASSESSMENT & PLAN NOTE
4/25/2024 echocardiogram showing moderate aortic stenosis with mild aortic regurgitation  Cardiology consulted last year  Echo next year

## 2025-04-10 NOTE — ASSESSMENT & PLAN NOTE
4/10/25  Continues to be bothersome on a very intermittent basis  Can be associated with Sinemet I suggested discussing this with his neurologist    6/3/2024  No new medications  Onset 3/24  Episodic perhaps 1-2 times per month and associated with nocturia/frequency  CARROLL, SSA, SSB negative  4/12/24 Hgb A1c 5.8  4/22/2024 BMP and UA unremarkable  History of negative HST 2018 and patient denies recent increased snoring

## 2025-04-10 NOTE — PROGRESS NOTES
Pt US order was faxed to to [facility] for scheduling per pt request [phone].  Jesse Fields CMA

## 2025-04-10 NOTE — ASSESSMENT & PLAN NOTE
4/10/25  Rare use of albuterol    6/3/24  History of asthma as a child  Albuterol not helpful  Exertional  Recent chest x-ray and CBC normal  PFT normal 4/20/2024  Echo 4/25/2024 revealing moderate aortic stenosis, mild aortic insufficiency, otherwise unremarkable

## 2025-04-10 NOTE — ASSESSMENT & PLAN NOTE
4/10/25  Had an episode of urinary retention requiring catheterization in the ER over the winter in [place] but has been asymptomatic since then    6/3/2024  -Urolift 10/15/21  -Chronic urinary frequency unchanged  -Urology F/U/16/24 with consideration of TURP vs. Prostate embolism

## 2025-04-10 NOTE — ASSESSMENT & PLAN NOTE
4/10/2025  Recurrent epigastric pain  Negative CT scan in 2022  Normal EGD in 2023  Lab upper quadrant ultrasound ordered

## 2025-04-15 ENCOUNTER — LAB (OUTPATIENT)
Dept: LAB | Facility: CLINIC | Age: 65
End: 2025-04-15
Payer: COMMERCIAL

## 2025-04-15 DIAGNOSIS — E88.810 METABOLIC SYNDROME: ICD-10-CM

## 2025-04-15 DIAGNOSIS — N52.9 ERECTILE DYSFUNCTION, UNSPECIFIED ERECTILE DYSFUNCTION TYPE: ICD-10-CM

## 2025-04-15 DIAGNOSIS — E78.5 DYSLIPIDEMIA: ICD-10-CM

## 2025-04-15 DIAGNOSIS — R10.13 RECURRENT EPIGASTRIC ABDOMINAL PAIN: ICD-10-CM

## 2025-04-15 DIAGNOSIS — R68.2 DRY MOUTH: ICD-10-CM

## 2025-04-15 LAB
ALBUMIN SERPL BCG-MCNC: 4.5 G/DL (ref 3.5–5.2)
ALP SERPL-CCNC: 69 U/L (ref 40–150)
ALT SERPL W P-5'-P-CCNC: 12 U/L (ref 0–70)
ANION GAP SERPL CALCULATED.3IONS-SCNC: 10 MMOL/L (ref 7–15)
AST SERPL W P-5'-P-CCNC: 42 U/L (ref 0–45)
BILIRUB DIRECT SERPL-MCNC: 0.18 MG/DL (ref 0–0.3)
BILIRUB SERPL-MCNC: 0.5 MG/DL
BUN SERPL-MCNC: 26.6 MG/DL (ref 8–23)
CALCIUM SERPL-MCNC: 9.5 MG/DL (ref 8.8–10.4)
CHLORIDE SERPL-SCNC: 103 MMOL/L (ref 98–107)
CHOLEST SERPL-MCNC: 147 MG/DL
CREAT SERPL-MCNC: 1.04 MG/DL (ref 0.67–1.17)
EGFRCR SERPLBLD CKD-EPI 2021: 81 ML/MIN/1.73M2
ERYTHROCYTE [DISTWIDTH] IN BLOOD BY AUTOMATED COUNT: 13.1 % (ref 10–15)
FASTING STATUS PATIENT QL REPORTED: YES
FASTING STATUS PATIENT QL REPORTED: YES
GLUCOSE SERPL-MCNC: 114 MG/DL (ref 70–99)
HCO3 SERPL-SCNC: 26 MMOL/L (ref 22–29)
HCT VFR BLD AUTO: 50.4 % (ref 40–53)
HDLC SERPL-MCNC: 31 MG/DL
HGB BLD-MCNC: 16.8 G/DL (ref 13.3–17.7)
LDLC SERPL CALC-MCNC: 92 MG/DL
MCH RBC QN AUTO: 29.2 PG (ref 26.5–33)
MCHC RBC AUTO-ENTMCNC: 33.3 G/DL (ref 31.5–36.5)
MCV RBC AUTO: 88 FL (ref 78–100)
NONHDLC SERPL-MCNC: 116 MG/DL
PLATELET # BLD AUTO: 204 10E3/UL (ref 150–450)
POTASSIUM SERPL-SCNC: 4.6 MMOL/L (ref 3.4–5.3)
PROT SERPL-MCNC: 7.1 G/DL (ref 6.4–8.3)
RBC # BLD AUTO: 5.75 10E6/UL (ref 4.4–5.9)
SHBG SERPL-SCNC: 36 NMOL/L (ref 11–80)
SODIUM SERPL-SCNC: 139 MMOL/L (ref 135–145)
TRIGL SERPL-MCNC: 119 MG/DL
WBC # BLD AUTO: 6.6 10E3/UL (ref 4–11)

## 2025-04-15 PROCEDURE — 80061 LIPID PANEL: CPT

## 2025-04-15 PROCEDURE — 85027 COMPLETE CBC AUTOMATED: CPT

## 2025-04-15 PROCEDURE — 84270 ASSAY OF SEX HORMONE GLOBUL: CPT

## 2025-04-15 PROCEDURE — 36415 COLL VENOUS BLD VENIPUNCTURE: CPT

## 2025-04-15 PROCEDURE — 82248 BILIRUBIN DIRECT: CPT

## 2025-04-15 PROCEDURE — 80053 COMPREHEN METABOLIC PANEL: CPT

## 2025-05-13 ENCOUNTER — TRANSFERRED RECORDS (OUTPATIENT)
Dept: HEALTH INFORMATION MANAGEMENT | Facility: CLINIC | Age: 65
End: 2025-05-13
Payer: COMMERCIAL

## 2025-08-20 ASSESSMENT — PATIENT HEALTH QUESTIONNAIRE - PHQ9
SUM OF ALL RESPONSES TO PHQ QUESTIONS 1-9: 5
SUM OF ALL RESPONSES TO PHQ QUESTIONS 1-9: 5
10. IF YOU CHECKED OFF ANY PROBLEMS, HOW DIFFICULT HAVE THESE PROBLEMS MADE IT FOR YOU TO DO YOUR WORK, TAKE CARE OF THINGS AT HOME, OR GET ALONG WITH OTHER PEOPLE: SOMEWHAT DIFFICULT

## 2025-08-21 ENCOUNTER — OFFICE VISIT (OUTPATIENT)
Dept: FAMILY MEDICINE | Facility: CLINIC | Age: 65
End: 2025-08-21
Payer: COMMERCIAL

## 2025-08-21 VITALS
HEART RATE: 64 BPM | SYSTOLIC BLOOD PRESSURE: 138 MMHG | OXYGEN SATURATION: 97 % | WEIGHT: 198 LBS | HEIGHT: 65 IN | BODY MASS INDEX: 32.99 KG/M2 | DIASTOLIC BLOOD PRESSURE: 77 MMHG | TEMPERATURE: 97.8 F | RESPIRATION RATE: 16 BRPM

## 2025-08-21 DIAGNOSIS — K40.20 NON-RECURRENT BILATERAL INGUINAL HERNIA WITHOUT OBSTRUCTION OR GANGRENE: ICD-10-CM

## 2025-08-21 DIAGNOSIS — K76.0 FATTY LIVER: ICD-10-CM

## 2025-08-21 DIAGNOSIS — R10.11 RUQ ABDOMINAL PAIN: Primary | ICD-10-CM

## 2025-08-21 LAB
ALBUMIN SERPL BCG-MCNC: 4.5 G/DL (ref 3.5–5.2)
ALP SERPL-CCNC: 62 U/L (ref 40–150)
ALT SERPL W P-5'-P-CCNC: 6 U/L (ref 0–70)
AST SERPL W P-5'-P-CCNC: 40 U/L (ref 0–45)
BILIRUB SERPL-MCNC: 0.3 MG/DL
BILIRUBIN DIRECT (ROCHE PRO & PURE): 0.1 MG/DL (ref 0–0.45)
PROT SERPL-MCNC: 5.7 G/DL (ref 6.4–8.3)

## 2025-08-21 RX ORDER — OXYCODONE HYDROCHLORIDE 5 MG/1
5 TABLET ORAL EVERY 6 HOURS PRN
COMMUNITY
Start: 2025-08-15

## 2025-08-21 RX ORDER — LIDOCAINE 4 G/G
1 PATCH TOPICAL EVERY 24 HOURS
COMMUNITY